# Patient Record
Sex: FEMALE | Race: WHITE | Employment: FULL TIME | URBAN - METROPOLITAN AREA
[De-identification: names, ages, dates, MRNs, and addresses within clinical notes are randomized per-mention and may not be internally consistent; named-entity substitution may affect disease eponyms.]

---

## 2017-01-23 DIAGNOSIS — H53.9 VISUAL DISTURBANCE: Primary | ICD-10-CM

## 2017-01-23 NOTE — PROGRESS NOTES
Patient's  related the patient is now having new neurologic symptoms. She is having visual obscuration without headache. This does not sound like common aura. Given the new symptoms she is having we will get an MRI scan of the brain and see her in the office once that has been completed.

## 2017-02-13 ENCOUNTER — HOSPITAL ENCOUNTER (OUTPATIENT)
Dept: MRI IMAGING | Age: 32
Discharge: HOME | End: 2017-02-13
Attending: PSYCHIATRY & NEUROLOGY
Payer: COMMERCIAL

## 2017-02-13 DIAGNOSIS — H53.9 VISUAL DISTURBANCE: ICD-10-CM

## 2017-02-13 PROCEDURE — 70553 MRI BRAIN STEM W/O & W/DYE: CPT

## 2017-02-13 PROCEDURE — A9585 GADOBUTROL INJECTION: HCPCS | Performed by: PSYCHIATRY & NEUROLOGY

## 2017-02-13 PROCEDURE — 74011250636 HC RX REV CODE- 250/636: Performed by: PSYCHIATRY & NEUROLOGY

## 2017-02-13 RX ADMIN — GADOBUTROL 6 ML: 604.72 INJECTION INTRAVENOUS at 12:38

## 2017-02-23 ENCOUNTER — OFFICE VISIT (OUTPATIENT)
Dept: INTERNAL MEDICINE CLINIC | Age: 32
End: 2017-02-23

## 2017-02-23 VITALS
HEIGHT: 65 IN | RESPIRATION RATE: 20 BRPM | TEMPERATURE: 98.6 F | SYSTOLIC BLOOD PRESSURE: 116 MMHG | DIASTOLIC BLOOD PRESSURE: 82 MMHG | WEIGHT: 144 LBS | BODY MASS INDEX: 23.99 KG/M2 | HEART RATE: 70 BPM | OXYGEN SATURATION: 97 %

## 2017-02-23 DIAGNOSIS — Z00.00 WELL ADULT EXAM: Primary | ICD-10-CM

## 2017-02-23 DIAGNOSIS — D50.8 IRON DEFICIENCY ANEMIA SECONDARY TO INADEQUATE DIETARY IRON INTAKE: ICD-10-CM

## 2017-02-23 DIAGNOSIS — R89.9 ABNORMAL LABORATORY TEST: ICD-10-CM

## 2017-02-23 DIAGNOSIS — Z00.00 WELL ADULT EXAM: ICD-10-CM

## 2017-02-23 NOTE — PROGRESS NOTES
Jaky Huntley is a 28 y.o. female and presents with [facility]  . Subjective:  Pt presents for NPV. She is a prior pt of Allan Hubbard MD   Her  is Ancelmo Duque from neurology upstairs with Dr. Nima Sandoval  Father is Mendy Bryant with [facility]    Daughter with allergy  Sleeping but cut out of diet  Breast feeding, supply good  Rectocele after first baby    constiopation  Cut corn  miralax      Migraines  Followed by Dr. Nima Sandoval  stable      Incidental finding of LDL cholesterol elevated  Can' t eat eggs      Review of Systems  Constitutional: negative for fevers, chills, anorexia and weight loss  Eyes:   negative for visual disturbance and irritation  ENT:   negative for tinnitus,sore throat,nasal congestion,ear pains. hoarseness  Respiratory:  negative for cough, hemoptysis, dyspnea,wheezing  CV:   negative for chest pain, palpitations, lower extremity edema  GI:   negative for nausea, vomiting, diarrhea, abdominal pain,melena  Endo:               negative for polyuria,polydipsia,polyphagia,heat intolerance  Genitourinary: negative for frequency, dysuria and hematuria  Integument:  negative for rash and pruritus  Hematologic:  negative for easy bruising and gum/nose bleeding  Musculoskel: negative for myalgias, arthralgias, back pain, muscle weakness, joint pain  Neurological:  negative for headaches, dizziness, vertigo, memory problems and gait   Behavl/Psych: negative for feelings of anxiety, depression, mood changes    Past Medical History:   Diagnosis Date    Headache     aura    Herpes simplex virus (HSV) infection     oral in genital region; no suppression due to scheduled c/s    Trauma     broken tail bone, VAGINAL delivery     Past Surgical History:   Procedure Laterality Date    HX GYN  10/2016         HX OTHER SURGICAL      wisdom teeth removed     Social History     Social History    Marital status:      Spouse name: N/A    Number of children: N/A    Years of education: N/A Social History Main Topics    Smoking status: Never Smoker    Smokeless tobacco: None    Alcohol use 0.6 - 1.2 oz/week     1 - 2 Cans of beer per week      Comment: occasional    Drug use: No    Sexual activity: Yes     Partners: Male     Birth control/ protection: None     Other Topics Concern    None     Social History Narrative    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        No Known Allergies    Objective:  Visit Vitals    /82 (BP 1 Location: Left arm, BP Patient Position: Sitting)    Pulse 70    Temp 98.6 °F (37 °C) (Oral)    Resp 20    Ht 5' 5\" (1.651 m)    Wt 144 lb (65.3 kg)    SpO2 97%    Breastfeeding Yes    BMI 23.96 kg/m2     Physical Exam:   General appearance - alert, well appearing, and in no distress  Mental status - alert, oriented to person, place, and time  EYE-REG, EOMI, corneas normal, no foreign bodies, visual acuity normal both eyes, no periorbital cellulitis  ENT-ENT exam normal, no neck nodes or sinus tenderness  Nose - normal and patent, no erythema, discharge or polyps  Mouth - mucous membranes moist, pharynx normal without lesions  Neck - supple, no significant adenopathy   Chest - clear to auscultation, no wheezes, rales or rhonchi, symmetric air entry   Heart - normal rate, regular rhythm, normal S1, S2, no murmurs, rubs, clicks or gallops   Abdomen - soft, nontender, nondistended, no masses or organomegaly  Lymph- no adenopathy palpable  Ext-peripheral pulses normal, no pedal edema, no clubbing or cyanosis  Skin-Warm and dry. no hyperpigmentation, vitiligo, or suspicious lesions  Neuro -alert, oriented, normal speech, no focal findings or movement disorder noted  Neck-normal C-spine, no tenderness, full ROM without pain      Results for orders placed or performed during the hospital encounter of 10/10/16   ANTIBODY SCREEN, EXTERNAL   Result Value Ref Range    Antibody screen, External negative    CHLAMYDIA DNA PROBE, EXTERNAL   Result Value Ref Range    Chlamydia, External negative    TYPE, ABO & RH, EXTERNAL   Result Value Ref Range    ABO,Rh O positive    HIV-1 AB, EXTERNAL   Result Value Ref Range    HIV, External non reactive    T PALLIDUM AB, EXTERNAL   Result Value Ref Range    T.  Pallidum Antibody, External negative    HEPATITIS B SURFACE AG, EXTERNAL   Result Value Ref Range    HBsAg, External negative    RUBELLA AB, IGG, EXTERNAL   Result Value Ref Range    Rubella, External non immune    N GONORRHOEAE, DNA PROBE, EXTERNAL   Result Value Ref Range    Gonorrhea, External negative    GYN RAPID GP B STREP, EXTERNAL   Result Value Ref Range    GrBStrep, External negative    CBC W/O DIFF   Result Value Ref Range    WBC 9.5 3.6 - 11.0 K/uL    RBC 3.66 (L) 3.80 - 5.20 M/uL    HGB 11.1 (L) 11.5 - 16.0 g/dL    HCT 33.4 (L) 35.0 - 47.0 %    MCV 91.3 80.0 - 99.0 FL    MCH 30.3 26.0 - 34.0 PG    MCHC 33.2 30.0 - 36.5 g/dL    RDW 15.6 (H) 11.5 - 14.5 %    PLATELET 949 (L) 478 - 400 K/uL   CBC WITH AUTOMATED DIFF   Result Value Ref Range    WBC 9.3 3.6 - 11.0 K/uL    RBC 3.02 (L) 3.80 - 5.20 M/uL    HGB 9.1 (L) 11.5 - 16.0 g/dL    HCT 27.4 (L) 35.0 - 47.0 %    MCV 90.7 80.0 - 99.0 FL    MCH 30.1 26.0 - 34.0 PG    MCHC 33.2 30.0 - 36.5 g/dL    RDW 15.7 (H) 11.5 - 14.5 %    PLATELET 755 (L) 335 - 400 K/uL    NEUTROPHILS 73 32 - 75 %    LYMPHOCYTES 18 12 - 49 %    MONOCYTES 8 5 - 13 %    EOSINOPHILS 1 0 - 7 %    BASOPHILS 0 0 - 1 %    ABS. NEUTROPHILS 6.8 1.8 - 8.0 K/UL    ABS. LYMPHOCYTES 1.7 0.8 - 3.5 K/UL    ABS. MONOCYTES 0.8 0.0 - 1.0 K/UL    ABS. EOSINOPHILS 0.1 0.0 - 0.4 K/UL    ABS. BASOPHILS 0.0 0.0 - 0.1 K/UL   TYPE & SCREEN   Result Value Ref Range    Crossmatch Expiration 10/13/2016     ABO/Rh(D) O POSITIVE     Antibody screen NEG      Prevention    Cardiovascular profile  Family hx  Exercising:  Elliptical   Blood pressure:  Health healthy diet:  Diabetes:  Cholesterol:  Renal function:      Cancer risk profile  Mammogram  Lung  Colonoscopy  Skin nonhealing in 2 weeks derm in past for acne rosacea  Gyn abnormal bleeding/discharge/abd pain/pressure, Tomeka Roney      Thyroid sx    Osteopenia prevention  Calcium 1000mg/day yes  Vitamin D 800iu/day yes    Mental health scale: 9/10  Depression  Anxiety  Sleep # of hours:  Energy Level:        Immunizations  TDAP 2016  Pneumonia vaccine  Flu vaccine  Shingles vaccine  HPV          Serge Stern was seen today for establish care. Diagnoses and all orders for this visit:    Well adult exam  Pt appears in good health. Her daugther has food allergies, she is breast feeding so on restricted diet with some fatigue  -     METABOLIC PANEL, COMPREHENSIVE; Future  -     LIPID PANEL; Future  -     TSH 3RD GENERATION; Future  -     CBC W/O DIFF; Future  -     FERRITIN; Future  -     IRON PROFILE; Future    Iron deficiency anemia secondary to inadequate dietary iron intake  Due to diet will check labs  -     CBC W/O DIFF; Future  -     FERRITIN; Future  -     IRON PROFILE; Future    Abnormal laboratory test  -     LIPID PANEL; Future    Bradycardia  Followed by cardiiology    Migraines   Followed by dr. Mandy Walters    This note will not be viewable in [address]

## 2017-02-23 NOTE — PATIENT INSTRUCTIONS

## 2017-02-23 NOTE — MR AVS SNAPSHOT
Visit Information Date & Time Provider Department Dept. Phone Encounter #  
 2/23/2017 10:15 AM Nicolasa Linder MD [facility] [address] [id number] Upcoming Health Maintenance Date Due  
 PAP AKA CERVICAL CYTOLOGY 6/20/2007 DTaP/Tdap/Td series (2 - Td) 7/1/2026 Allergies as of 2/23/2017  Review Complete On: 2/23/2017 By: Nicolasa Linder MD  
 No Known Allergies Current Immunizations  Reviewed on 2/23/2017 Name Date Influenza Vaccine 9/30/2016 MMR 10/12/2016 11:38 AM  
 Tdap 7/1/2016 Reviewed by Ángel Manley LPN on 0/37/6705 at 50:06 AM  
 Reviewed by Ángel Manley LPN on 6/65/6651 at 35:63 AM  
 Reviewed by Ángel Manley LPN on 5/06/8777 at 60:96 AM  
You Were Diagnosed With   
  
 Codes Comments Well adult exam    -  Primary ICD-10-CM: Z00.00 ICD-9-CM: V70.0 Iron deficiency anemia secondary to inadequate dietary iron intake     ICD-10-CM: D50.8 ICD-9-CM: 280. 1 Abnormal laboratory test     ICD-10-CM: R89.9 ICD-9-CM: 796.4 Vitals BP  
  
  
  
  
  
 116/82 (BP 1 Location: Left arm, BP Patient Position: Sitting) Vitals History BMI and BSA Data Body Mass Index Body Surface Area  
 23.96 kg/m 2 1.73 m 2 Preferred Pharmacy Pharmacy Name Phone [phone] [facility], [address] [phone] Your Updated Medication List  
  
   
This list is accurate as of: 2/23/17 11:30 AM.  Always use your most recent med list.  
  
  
  
  
 PRENATAL DHA+COMPLETE PRENATAL -300 mg-mcg-mg Cmpk Generic drug:  PNV no.24-iron-folic acid-dha Take  by mouth. To-Do List   
 02/23/2017 Lab:  CBC W/O DIFF   
  
 02/23/2017 Lab:  FERRITIN   
  
 02/23/2017 Lab:  IRON PROFILE   
  
 02/23/2017 Lab:  LIPID PANEL   
  
 02/23/2017 Lab:  METABOLIC PANEL, COMPREHENSIVE   
  
 02/23/2017   Lab:  TSH 3RD GENERATION   
  
  
 Patient Instructions Well Visit, Ages 25 to 48: Care Instructions Your Care Instructions Physical exams can help you stay healthy. Your doctor has checked your overall health and may have suggested ways to take good care of yourself. He or she also may have recommended tests. At home, you can help prevent illness with healthy eating, regular exercise, and other steps. Follow-up care is a key part of your treatment and safety. Be sure to make and go to all appointments, and call your doctor if you are having problems. It's also a good idea to know your test results and keep a list of the medicines you take. How can you care for yourself at home? · Reach and stay at a healthy weight. This will lower your risk for many problems, such as obesity, diabetes, heart disease, and high blood pressure. · Get at least 30 minutes of physical activity on most days of the week. Walking is a good choice. You also may want to do other activities, such as running, swimming, cycling, or playing tennis or team sports. Discuss any changes in your exercise program with your doctor. · Do not smoke or allow others to smoke around you. If you need help quitting, talk to your doctor about stop-smoking programs and medicines. These can increase your chances of quitting for good. · Talk to your doctor about whether you have any risk factors for sexually transmitted infections (STIs). Having one sex partner (who does not have STIs and does not have sex with anyone else) is a good way to avoid these infections. · Use birth control if you do not want to have children at this time. Talk with your doctor about the choices available and what might be best for you. · Protect your skin from too much sun. When you're outdoors from 10 a.m. to 4 p.m., stay in the shade or cover up with clothing and a hat with a wide brim. Wear sunglasses that block UV rays.  Even when it's cloudy, put broad-spectrum sunscreen (SPF 30 or higher) on any exposed skin. · See a dentist one or two times a year for checkups and to have your teeth cleaned. · Wear a seat belt in the car. · Drink alcohol in moderation, if at all. That means no more than 2 drinks a day for men and 1 drink a day for women. Follow your doctor's advice about when to have certain tests. These tests can spot problems early. For everyone · Cholesterol. Have the fat (cholesterol) in your blood tested after age 21. Your doctor will tell you how often to have this done based on your age, family history, or other things that can increase your risk for heart disease. · Blood pressure. Have your blood pressure checked during a routine doctor visit. Your doctor will tell you how often to check your blood pressure based on your age, your blood pressure results, and other factors. · Vision. Talk with your doctor about how often to have a glaucoma test. 
· Diabetes. Ask your doctor whether you should have tests for diabetes. · Colon cancer. Have a test for colon cancer at age 48. You may have one of several tests. If you are younger than 48, you may need a test earlier if you have any risk factors. Risk factors include whether you already had a precancerous polyp removed from your colon or whether your parent, brother, sister, or child has had colon cancer. For women · Breast exam and mammogram. Talk to your doctor about when you should have a clinical breast exam and a mammogram. Medical experts differ on whether and how often women under 50 should have these tests. Your doctor can help you decide what is right for you. · Pap test and pelvic exam. Begin Pap tests at age 24. A Pap test is the best way to find cervical cancer. The test often is part of a pelvic exam. Ask how often to have this test. 
· Tests for sexually transmitted infections (STIs).  Ask whether you should have tests for STIs. You may be at risk if you have sex with more than one person, especially if your partners do not wear condoms. For men · Tests for sexually transmitted infections (STIs). Ask whether you should have tests for STIs. You may be at risk if you have sex with more than one person, especially if you do not wear a condom. · Testicular cancer exam. Ask your doctor whether you should check your testicles regularly. · Prostate exam. Talk to your doctor about whether you should have a blood test (called a PSA test) for prostate cancer. Experts differ on whether and when men should have this test. Some experts suggest it if you are older than 39 and are -American or have a father or brother who got prostate cancer when he was younger than 72. When should you call for help? Watch closely for changes in your health, and be sure to contact your doctor if you have any problems or symptoms that concern you. Where can you learn more? Go to http://braydon-dorian.info/. Enter P072 in the search box to learn more about \"Well Visit, Ages 25 to 48: Care Instructions. \" Current as of: July 19, 2016 Content Version: 11.1 © 6913-3067 [employer]. Care instructions adapted under license by [employer] (which disclaims liability or warranty for this information). If you have questions about a medical condition or this instruction, always ask your healthcare professional. [address] any warranty or liability for your use of this information. Introducing [facility] [place] [facility]! Dear Lizzie Cross: 
Thank you for requesting a [employer] account. Our records indicate that you already have an active [employer] account. You can access your account anytime at [email or web address] Did you know that you can access your hospital and ER discharge instructions at any time in [employer]?   You can also review all of your test results from your hospital stay or ER visit. Additional Information If you have questions, please visit the Frequently Asked Questions section of the [employer] website at [email or web address] Remember, [employer] is NOT to be used for urgent needs. For medical emergencies, dial 911. Now available from your iPhone and Android! Please provide this summary of care documentation to your next provider. Your primary care clinician is listed as [facility]. If you have any questions after today's visit, please call [phone].

## 2017-02-25 LAB
ALBUMIN SERPL-MCNC: 4.7 G/DL (ref 3.5–5.5)
ALBUMIN/GLOB SERPL: 1.6 {RATIO} (ref 1.1–2.5)
ALP SERPL-CCNC: 121 IU/L (ref 39–117)
ALT SERPL-CCNC: 18 IU/L (ref 0–32)
AST SERPL-CCNC: 19 IU/L (ref 0–40)
BILIRUB SERPL-MCNC: 0.3 MG/DL (ref 0–1.2)
BUN SERPL-MCNC: 11 MG/DL (ref 6–20)
BUN/CREAT SERPL: 15 (ref 8–20)
CALCIUM SERPL-MCNC: 9.8 MG/DL (ref 8.7–10.2)
CHLORIDE SERPL-SCNC: 105 MMOL/L (ref 96–106)
CHOLEST SERPL-MCNC: 191 MG/DL (ref 100–199)
CO2 SERPL-SCNC: 23 MMOL/L (ref 18–29)
CREAT SERPL-MCNC: 0.72 MG/DL (ref 0.57–1)
ERYTHROCYTE [DISTWIDTH] IN BLOOD BY AUTOMATED COUNT: 14.2 % (ref 12.3–15.4)
FERRITIN SERPL-MCNC: 120 NG/ML (ref 15–150)
GLOBULIN SER CALC-MCNC: 3 G/DL (ref 1.5–4.5)
GLUCOSE SERPL-MCNC: 82 MG/DL (ref 65–99)
HCT VFR BLD AUTO: 40.6 % (ref 34–46.6)
HDLC SERPL-MCNC: 56 MG/DL
HGB BLD-MCNC: 13.3 G/DL (ref 11.1–15.9)
INTERPRETATION, 910389: NORMAL
IRON SATN MFR SERPL: 25 % (ref 15–55)
IRON SERPL-MCNC: 88 UG/DL (ref 27–159)
LDLC SERPL CALC-MCNC: 116 MG/DL (ref 0–99)
MCH RBC QN AUTO: 30 PG (ref 26.6–33)
MCHC RBC AUTO-ENTMCNC: 32.8 G/DL (ref 31.5–35.7)
MCV RBC AUTO: 91 FL (ref 79–97)
PLATELET # BLD AUTO: 173 X10E3/UL (ref 150–379)
POTASSIUM SERPL-SCNC: 4.4 MMOL/L (ref 3.5–5.2)
PROT SERPL-MCNC: 7.7 G/DL (ref 6–8.5)
RBC # BLD AUTO: 4.44 X10E6/UL (ref 3.77–5.28)
SODIUM SERPL-SCNC: 145 MMOL/L (ref 134–144)
TIBC SERPL-MCNC: 351 UG/DL (ref 250–450)
TRIGL SERPL-MCNC: 94 MG/DL (ref 0–149)
TSH SERPL DL<=0.005 MIU/L-ACNC: 2.55 UIU/ML (ref 0.45–4.5)
UIBC SERPL-MCNC: 263 UG/DL (ref 131–425)
VLDLC SERPL CALC-MCNC: 19 MG/DL (ref 5–40)
WBC # BLD AUTO: 5.5 X10E3/UL (ref 3.4–10.8)

## 2017-03-31 DIAGNOSIS — R53.83 FATIGUE, UNSPECIFIED TYPE: Primary | ICD-10-CM

## 2017-04-07 RX ORDER — ZOLMITRIPTAN 5 MG/1
SPRAY NASAL
Qty: 9 CONTAINER | Refills: 3 | Status: SHIPPED | OUTPATIENT
Start: 2017-04-07 | End: 2018-04-05 | Stop reason: ALTCHOICE

## 2017-04-07 NOTE — PROGRESS NOTES
I spoke with the patient's  today and Zomig nasal is working well for her migraines in terms of abortive therapy. I have given him 2 samples as well as a co-pay card and sent a prescription to the [facility].

## 2017-05-15 ENCOUNTER — DOCUMENTATION ONLY (OUTPATIENT)
Dept: SURGERY | Age: 32
End: 2017-05-15

## 2017-05-16 ENCOUNTER — TELEPHONE (OUTPATIENT)
Dept: SURGERY | Age: 32
End: 2017-05-16

## 2017-05-16 ENCOUNTER — HOSPITAL ENCOUNTER (OUTPATIENT)
Dept: LAB | Age: 32
Discharge: HOME | End: 2017-05-16

## 2017-05-16 ENCOUNTER — DOCUMENTATION ONLY (OUTPATIENT)
Dept: SURGERY | Age: 32
End: 2017-05-16

## 2017-05-16 ENCOUNTER — OFFICE VISIT (OUTPATIENT)
Dept: SURGERY | Age: 32
End: 2017-05-16

## 2017-05-16 VITALS
SYSTOLIC BLOOD PRESSURE: 135 MMHG | HEART RATE: 69 BPM | WEIGHT: 139 LBS | BODY MASS INDEX: 23.16 KG/M2 | DIASTOLIC BLOOD PRESSURE: 93 MMHG | HEIGHT: 65 IN

## 2017-05-16 DIAGNOSIS — N63.20 LEFT BREAST LUMP: Primary | ICD-10-CM

## 2017-05-16 NOTE — PROGRESS NOTES
[facility]  OFFICE PROCEDURE PROGRESS NOTE        Chart reviewed for the following:   I, Dr. Saba Gómez have reviewed the History, Physical and updated the Allergic reactions for [facility]     TIME OUT performed immediately prior to start of procedure:   I, Dr. Saba Gómez, have performed the following reviews on Leta Bailon prior to the start of the procedure:            * Patient was identified by name and date of birth   * Agreement on procedure being performed was verified  * Risks and Benefits explained to the patient  * Procedure site verified and marked as necessary  * Patient was positioned for comfort  * Consent was signed and verified     Time: 1015am      Date of procedure: 5/16/2017    Procedure performed by:  Dr. Saba Gómez    Provider assisted by: Ree Paul RN    Patient assisted by:Yanna Staley RN    How tolerated by patient: Patient tolerated the procedure well without complications. Denies pain post biopsy. Post Procedural Pain Scale: 0    Comments:Written and verbal post biopsy instructions reviewed with and given to patient with her understanding.

## 2017-05-16 NOTE — TELEPHONE ENCOUNTER
Patient had a breast biopsy this morning at [facility] by Dr. Coni Uriostegui. L/M this afternoon that she was still bleeding. I tried to call the patient back, but was unable to reach her, so I L/M for her to call us back on the nurse line since it is almost after hours. I informed her nurse, Francheska Anderson, that the patient had called.

## 2017-05-16 NOTE — PROGRESS NOTES
HISTORY OF PRESENT ILLNESS  Mt Sanchez is a 28 y.o. female. HPI NEW patient referral for consultation by Dr. Paul Cannon for a LEFT palpable breast mass for one a month. The breast lump is not painful to palpation. The patient is still nursing her 11 month old daughter. She had mastitis with her first baby. The patient denies any nipple inversion or skin changes. Obstetric History       T1      TAB0   SAB0   E0   M0   L0    Obstetric Comments   Menarche:  Age 16. SPC:8877. # of Children: 2  Age at Delivery of First Child: 33.   Hysterectomy/oophorectomy no/no. Breast Bx: no.  Hx of Breast Feeding: yes. BCP: 3304-7366 currently using IUD. Hormone therapy: none. Family history-  Maternal grandmother had breast cancer age 64 and age 79 and is . Maternal great grandmother had post menopausal breast cancer. Mammogram and ultrasound -     Review of Systems   Constitutional: Negative. HENT: Negative. Eyes: Negative. Respiratory: Negative. Cardiovascular: Negative. Gastrointestinal: Negative. Genitourinary: Negative. Musculoskeletal: Negative. Skin: Negative. Neurological: Negative. Endo/Heme/Allergies: Negative. Psychiatric/Behavioral: Negative. Physical Exam   Pulmonary/Chest: Right breast exhibits no inverted nipple, no mass, no nipple discharge, no skin change and no tenderness. Left breast exhibits mass (8 mm oval mass with dropout 12:00 1/3). Left breast exhibits no inverted nipple, no nipple discharge, no skin change and no tenderness. Breasts are symmetrical.       Lymphadenopathy:     She has no cervical adenopathy. She has no axillary adenopathy. Right: No supraclavicular adenopathy present. Left: No supraclavicular adenopathy present. US - Guided Core Biopsy  Indication : Left Breast mass 12:00 1/3 .   palpable. Ultrasound Findings: 8 mm oval mass with dropout  Prep : alcohol.    Anesthesia : 1% lidocaine with epinephrine, 8 cc. Device : The hand-held 10 gauge BARD needle was inserted through the lesion and captured tissue with real-time Ultrasound Confirmation. .   Core Sampling :  3 cores were obtained. Marker: clip placed   Dressing : Steristrips, gauze and tape. Instructions : Remove gauze this evening. Remove steristrips in one week. 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 Concordance: yes  ASSESSMENT and PLAN    ICD-10-CM ICD-9-CM    1. Left breast lump N63 611.72 SURGICAL PATHOLOGY     LEFT breast mass biopsied    Called patient with benign pathology report. She had bleeding from the biopsy site all day after the biopsy. That has stopped. Milk is blood tinged but getting lighter and should resolve completely in a day or two.

## 2017-05-16 NOTE — TELEPHONE ENCOUNTER
Called patient back after speaking with Dr. Jimenez Alston. Advised pressure dressing, ice and rest if possible. Recommended trying to pump with dressing on if possible and hold pumping for as long as possible. The patient will do all of the above. She says that she may try to nurse from the side that was biopsied to see if this works better. Advised her to call tomorrow if she has any further questions/problems. She was very appreciative of the phone call.

## 2017-05-16 NOTE — PATIENT INSTRUCTIONS
Breast Lumps: Care Instructions  Your Care Instructions  Breast lumps are common, especially in women between ages 27 and 48. Many womens breasts feel lumpy and tender before their menstrual period. Women also may have lumps when they are breastfeeding. Breast lumps may go away after menopause. All new breast lumps in women after menopause should be checked by a doctor. Although lumps may be normal for you, it is important to have your doctor check any lump or thickness that is not like the rest of your breast to make sure it is not cancer. A lump may be larger, harder, or different from the rest of your breast tissue. Follow-up care is a key part of your treatment and safety. Be sure to make and go to all appointments, and call your doctor if you are having problems. Its also a good idea to know your test results and keep a list of the medicines you take. How can you care for yourself at home? · Make an appointment to have a mammogram and other follow-up visits as recommended by your doctor. When should you call for help? Call your doctor now or seek immediate medical care if:  · You have new changes in a breast, such as:  ¨ A lump or thickening in your breast or armpit. ¨ A change in the breast's size or shape. ¨ Skin changes, such as dimples or puckers. ¨ Nipple discharge. ¨ A change in the color or feel of the skin of your breast or the darker area around the nipple (areola). ¨ A change in the shape of the nipple (it may look like it's being pulled into the breast). · You have symptoms of a breast infection, such as:  ¨ Increased pain, swelling, redness, or warmth around a breast.  ¨ Red streaks extending from the breast.  ¨ Pus draining from a breast.  ¨ A fever. Watch closely for changes in your health, and be sure to contact your doctor if:  · You do not get better as expected. Where can you learn more? Go to http://braydon-dorian.info/.   Enter B023 in the search box to learn more about \"Breast Lumps: Care Instructions. \"  Current as of: October 13, 2016  Content Version: 11.2  © 3819-4447 [employer], [employer]. Care instructions adapted under license by [employer] (which disclaims liability or warranty for this information). If you have questions about a medical condition or this instruction, always ask your healthcare professional. [address] any warranty or liability for your use of this information.

## 2017-05-18 NOTE — COMMUNICATION BODY
HISTORY OF PRESENT ILLNESS  Megha Cruz is a 28 y.o. female. HPI NEW patient referral for consultation by Dr. Donnette Kehr for a LEFT palpable breast mass for one a month. The breast lump is not painful to palpation. The patient is still nursing her 11 month old daughter. She had mastitis with her first baby. The patient denies any nipple inversion or skin changes. Obstetric History       T1      TAB0   SAB0   E0   M0   L0    Obstetric Comments   Menarche:  Age 16. [id number]. # of Children: 2  Age at Delivery of First Child: 33.   Hysterectomy/oophorectomy no/no. Breast Bx: no.  Hx of Breast Feeding: yes. BCP: 7419-4995 currently using IUD. Hormone therapy: none. Family history-  Maternal grandmother had breast cancer age 64 and age 79 and is . Maternal great grandmother had post menopausal breast cancer. Mammogram and ultrasound -     Review of Systems   Constitutional: Negative. HENT: Negative. Eyes: Negative. Respiratory: Negative. Cardiovascular: Negative. Gastrointestinal: Negative. Genitourinary: Negative. Musculoskeletal: Negative. Skin: Negative. Neurological: Negative. Endo/Heme/Allergies: Negative. Psychiatric/Behavioral: Negative. Physical Exam   Pulmonary/Chest: Right breast exhibits no inverted nipple, no mass, no nipple discharge, no skin change and no tenderness. Left breast exhibits mass (8 mm oval mass with dropout 12:00 1/3). Left breast exhibits no inverted nipple, no nipple discharge, no skin change and no tenderness. Breasts are symmetrical.       Lymphadenopathy:     She has no cervical adenopathy. She has no axillary adenopathy. Right: No supraclavicular adenopathy present. Left: No supraclavicular adenopathy present. US - Guided Core Biopsy  Indication : Left Breast mass 12:00 1/3 .   palpable. Ultrasound Findings: 8 mm oval mass with dropout  Prep : alcohol.    Anesthesia : 1% lidocaine with epinephrine, 8 cc. Device : The hand-held 10 gauge BARD needle was inserted through the lesion and captured tissue with real-time Ultrasound Confirmation. .   Core Sampling :  3 cores were obtained. Marker: clip placed   Dressing : Steristrips, gauze and tape. Instructions : Remove gauze this evening. Remove steristrips in one week. 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 Concordance: yes  ASSESSMENT and PLAN    ICD-10-CM ICD-9-CM    1. Left breast lump N63 611.72 SURGICAL PATHOLOGY     LEFT breast mass biopsied    Called patient with benign pathology report. She had bleeding from the biopsy site all day after the biopsy. That has stopped. Milk is blood tinged but getting lighter and should resolve completely in a day or two.

## 2017-05-30 RX ORDER — ZOLMITRIPTAN 5 MG/1
TABLET, ORALLY DISINTEGRATING ORAL
Qty: 9 TAB | Refills: 5 | Status: SHIPPED | OUTPATIENT
Start: 2017-05-30 | End: 2018-04-05 | Stop reason: ALTCHOICE

## 2017-06-02 ENCOUNTER — TELEPHONE (OUTPATIENT)
Dept: NEUROLOGY | Age: 32
End: 2017-06-02

## 2017-08-30 NOTE — TELEPHONE ENCOUNTER
Per patient, increase in frequency of headaches and side effects from zomig. Verbal order received from provider and called to pharmacy listed.

## 2017-11-03 NOTE — PROGRESS NOTES
I received a call from the patient's  today. Patient has been prescribed Midrin but the pharmacy they were using wanted to charge an exorbitant amount of money.   I reprinted a prescription today because the  found a pharmacy that would provide the medication at a reasonable cost

## 2017-12-07 ENCOUNTER — OFFICE VISIT (OUTPATIENT)
Dept: INTERNAL MEDICINE CLINIC | Age: 32
End: 2017-12-07

## 2017-12-07 VITALS
RESPIRATION RATE: 18 BRPM | HEART RATE: 86 BPM | WEIGHT: 151 LBS | OXYGEN SATURATION: 97 % | BODY MASS INDEX: 25.16 KG/M2 | SYSTOLIC BLOOD PRESSURE: 113 MMHG | HEIGHT: 65 IN | TEMPERATURE: 98.3 F | DIASTOLIC BLOOD PRESSURE: 79 MMHG

## 2017-12-07 DIAGNOSIS — R53.83 FATIGUE, UNSPECIFIED TYPE: ICD-10-CM

## 2017-12-07 DIAGNOSIS — E55.9 VITAMIN D DEFICIENCY: ICD-10-CM

## 2017-12-07 DIAGNOSIS — K92.1 BLOOD IN STOOL: ICD-10-CM

## 2017-12-07 DIAGNOSIS — Z00.00 WELL ADULT EXAM: Primary | ICD-10-CM

## 2017-12-07 NOTE — PATIENT INSTRUCTIONS

## 2017-12-07 NOTE — PROGRESS NOTES
Jennie Chang is a 33 y.o. female and presents with Complete Physical  .    Subjective:      Fatigue  Energy level 7/10 in the am, 5/10 at noon, 5-7 then back to 5/10 dinner,   Baseline 7-8/10  Sleeps 8-10 hours/sleep  Occasional snoring  No constitutinal sx or joint pain but baseline hip issues  July went to see Bipin Barnes biopsy      Cardiology  Bradycardia   MV regurge other   She does not wear a fit bit    constiopation  Cut corn  miralax prn      Migraines  Followed by Dr. Juancarlos Blank  stable  Has not been on amitriptyline      Review of Systems  Constitutional: negative for fevers, chills, anorexia and weight loss  Eyes:   negative for visual disturbance and irritation  ENT:   negative for tinnitus,sore throat,nasal congestion,ear pains. hoarseness  Respiratory:  negative for cough, hemoptysis, dyspnea,wheezing  CV:   negative for chest pain, palpitations, lower extremity edema  GI:   negative for nausea, vomiting, diarrhea, abdominal pain,melena  Endo:               negative for polyuria,polydipsia,polyphagia,heat intolerance  Genitourinary: negative for frequency, dysuria and hematuria  Integument:  negative for rash and pruritus  Hematologic:  negative for easy bruising and gum/nose bleeding  Musculoskel: negative for myalgias, arthralgias, back pain, muscle weakness, joint pain  Neurological:  negative for headaches, dizziness, vertigo, memory problems and gait   Behavl/Psych: negative for feelings of anxiety, depression, mood changes    Past Medical History:   Diagnosis Date    Headache      migraines aura    Herpes simplex virus (HSV) infection     oral in genital region; no suppression due to scheduled c/s    Trauma     broken tail bone, VAGINAL delivery     Past Surgical History:   Procedure Laterality Date    HX GYN  10/2016         HX OTHER SURGICAL      wisdom teeth removed     Social History     Social History    Marital status:      Spouse name: N/A    Number of children: N/A    Years of education: N/A     Social History Main Topics    Smoking status: Never Smoker    Smokeless tobacco: None    Alcohol use 0.6 - 1.2 oz/week     1 - 2 Cans of beer per week      Comment: occasional    Drug use: No    Sexual activity: Yes     Partners: Male     Birth control/ protection: None     Other Topics Concern    None     Social History Narrative    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    Did [facility] health            2 kids    3.6 yo and 4.5 months         Family History   Problem Relation Age of Onset    Headache Mother      htn    Hypertension Mother     Headache Father      heart block    Heart Disease Father     Cancer Maternal Grandmother      breast    Heart Disease Maternal Grandfather     Heart Disease Paternal Grandmother     Heart Disease Paternal Grandfather     Cancer Other      breast     Current Outpatient Prescriptions   Medication Sig Dispense Refill    hkkmohp-gdzwrftcb-uaaysvcwgkzi (MIDRIN) -325 mg capsule Take 2 tabs at headache onset and repeat every hour x 5 doses. Max 5 tabs in 24 hours. 30 Cap 3    ZOLMitriptan (ZOMIG ZMT) 5 mg disintegrating tablet Take 1 tab at headache onset and may repeat in 2 hours x 1 dose. Max 2 tabs in 24 hours.  9 Tab 5    ZOLMitriptan (ZOMIG) 5 mg nasal solution 1 spray at HA onset and repeat in 2 hours x 1 prn 9 Container 3     No Known Allergies    Objective:  Visit Vitals    /79 (BP 1 Location: Left arm, BP Patient Position: Sitting)    Pulse 86    Temp 98.3 °F (36.8 °C) (Oral)    Resp 18    Ht 5' 5\" (1.651 m)    Wt 151 lb (68.5 kg)    LMP  (LMP Unknown)    SpO2 97%    Breastfeeding No    BMI 25.13 kg/m2     Physical Exam:   General appearance - alert, well appearing, and in no distress  Mental status - alert, oriented to person, place, and time  EYE-REG, EOMI, corneas normal, no foreign bodies, visual acuity normal both eyes, no periorbital cellulitis  ENT-ENT exam normal, no neck nodes or sinus tenderness  Nose - normal and patent, no erythema, discharge or polyps  Mouth - mucous membranes moist, pharynx normal without lesions  Neck - supple, no significant adenopathy   Chest - clear to auscultation, no wheezes, rales or rhonchi, symmetric air entry   Heart - normal rate, regular rhythm, normal S1, S2, no murmurs, rubs, clicks or gallops   Abdomen - soft, nontender, nondistended, no masses or organomegaly  Lymph- no adenopathy palpable  Ext-peripheral pulses normal, no pedal edema, no clubbing or cyanosis  Skin-Warm and dry. no hyperpigmentation, vitiligo, or suspicious lesions  Neuro -alert, oriented, normal speech, no focal findings or movement disorder noted  Neck-normal C-spine, no tenderness, full ROM without pain      Results for orders placed or performed in visit on 26/21/88   METABOLIC PANEL, COMPREHENSIVE   Result Value Ref Range    Glucose 82 65 - 99 mg/dL    BUN 11 6 - 20 mg/dL    Creatinine 0.72 0.57 - 1.00 mg/dL    GFR est non- >59 mL/min/1.73    GFR est  >59 mL/min/1.73    BUN/Creatinine ratio 15 8 - 20    Sodium 145 (H) 134 - 144 mmol/L    Potassium 4.4 3.5 - 5.2 mmol/L    Chloride 105 96 - 106 mmol/L    CO2 23 18 - 29 mmol/L    Calcium 9.8 8.7 - 10.2 mg/dL    Protein, total 7.7 6.0 - 8.5 g/dL    Albumin 4.7 3.5 - 5.5 g/dL    GLOBULIN, TOTAL 3.0 1.5 - 4.5 g/dL    A-G Ratio 1.6 1.1 - 2.5    Bilirubin, total 0.3 0.0 - 1.2 mg/dL    Alk.  phosphatase 121 (H) 39 - 117 IU/L    AST (SGOT) 19 0 - 40 IU/L    ALT (SGPT) 18 0 - 32 IU/L   LIPID PANEL   Result Value Ref Range    Cholesterol, total 191 100 - 199 mg/dL    Triglyceride 94 0 - 149 mg/dL    HDL Cholesterol 56 >39 mg/dL    VLDL, calculated 19 5 - 40 mg/dL    LDL, calculated 116 (H) 0 - 99 mg/dL   TSH 3RD GENERATION   Result Value Ref Range    TSH 2.550 0.450 - 4.500 uIU/mL   CBC W/O DIFF   Result Value Ref Range    WBC 5.5 3.4 - 10.8 x10E3/uL    RBC 4.44 3.77 - 5.28 x10E6/uL    HGB 13.3 11.1 - 15.9 g/dL    HCT 40.6 34.0 - 46.6 %    MCV 91 79 - 97 fL    MCH 30.0 26.6 - 33.0 pg    MCHC 32.8 31.5 - 35.7 g/dL    RDW 14.2 12.3 - 15.4 %    PLATELET 502 524 - 569 x10E3/uL   FERRITIN   Result Value Ref Range    Ferritin 120 15 - 150 ng/mL   IRON PROFILE   Result Value Ref Range    TIBC 351 250 - 450 ug/dL    UIBC 263 131 - 425 ug/dL    Iron 88 27 - 159 ug/dL    Iron % saturation 25 15 - 55 %   CVD REPORT   Result Value Ref Range    INTERPRETATION Note      Prevention    Cardiovascular profile  Family hx  Exercising:  Elliptical   Blood pressure:  Health healthy diet:  Diabetes:  Cholesterol:  Renal function:      Cancer risk profile  Mamm[facility]  Lung exercise intolerance  Colonoscopy chronic constipation painful docusate  Skin nonhealing in 2 weeks derm in past for acne rosacea  Gyn abnormal bleeding/discharge/abd pain/pressure, Jillian Holiday      Thyroid sx    Osteopenia prevention  Calcium 1000mg/day yes  Vitamin D 800iu/day yes    Mental health scale: 9/10  Depression  Anxiety  Sleep # of hours:  Energy Level:        Immunizations  TDAP 2016  Pneumonia vaccine  Flu vaccine  Shingles vaccine  HPV          Diagnoses and all orders for this visit:    1. Well adult exam  Pt appears in overall good health. She does have some fatigue  -     CBC W/O DIFF  -     METABOLIC PANEL, COMPREHENSIVE  -     LIPID PANEL  -     SED RATE (ESR)    2. Fatigue, unspecified type  Notes she does worry a lot, worrier  Will check labs  May benefit from amitripyline for migraines and maybe for worry- benefit rem possibly  contsipation may be contributing  -     CBC W/O DIFF  -     SED RATE (ESR)  -     MARK W/REFLEX  -     REFERRAL TO GASTROENTEROLOGY    3. Blood in stool  Will check fit  I think constipation is contributing to [address]  Will take docusate  -     OCCULT BLOOD, IMMUNOASSAY (FIT);  Future  -     REFERRAL TO PHYSICAL THERAPY  -     REFERRAL TO GASTROENTEROLOGY    4. Vitamin D deficiency  -     VITAMIN D, 25 HYDROXY      Follow up in 2-3 months    This note will not be viewable in MyChart.

## 2017-12-07 NOTE — MR AVS SNAPSHOT
Visit Information Date & Time Provider Department Dept. Phone Encounter #  
 12/7/2017  9:00 AM Jessica Vuong MD [facility] [address] [id number] Upcoming Health Maintenance Date Due  
 PAP AKA CERVICAL CYTOLOGY 6/20/2007 DTaP/Tdap/Td series (2 - Td) 7/1/2026 Allergies as of 12/7/2017  Review Complete On: 12/7/2017 By: Jessica Vuong MD  
 No Known Allergies Current Immunizations  Reviewed on 12/7/2017 Name Date Influenza Vaccine 10/30/2017, 9/30/2016 MMR 10/12/2016 11:38 AM  
 Tdap 7/1/2016 Reviewed by Dinah Guzman LPN on 02/7/1384 at  9:07 AM  
You Were Diagnosed With   
  
 Codes Comments Well adult exam    -  Primary ICD-10-CM: Z00.00 ICD-9-CM: V70.0 Fatigue, unspecified type     ICD-10-CM: R53.83 ICD-9-CM: 780.79 Blood in stool     ICD-10-CM: K92.1 ICD-9-CM: 578.1 Vitamin D deficiency     ICD-10-CM: E55.9 ICD-9-CM: 268.9 Vitals BP Pulse Temp Resp Height(growth percentile) Weight(growth percentile) 113/79 (BP 1 Location: Left arm, BP Patient Position: Sitting) 86 98.3 °F (36.8 °C) (Oral) 18 5' 5\" (1.651 m) 151 lb (68.5 kg) LMP SpO2 Breastfeeding? BMI OB Status Smoking Status (LMP Unknown) 97% No 25.13 kg/m2 IUD Never Smoker Vitals History BMI and BSA Data Body Mass Index Body Surface Area  
 25.13 kg/m 2 1.77 m 2 Preferred Pharmacy Pharmacy Name Phone [facility] - [address] [phone] Your Updated Medication List  
  
   
This list is accurate as of: 12/7/17 10:13 AM.  Always use your most recent med list.  
  
  
  
  
 avzabvz-gqvljvriv-khjulzsakfdr -325 mg capsule Commonly known as:  Preethi Cronin Take 2 tabs at headache onset and repeat every hour x 5 doses. Max 5 tabs in 24 hours. * ZOLMitriptan 5 mg nasal solution Commonly known as:  ZOMIG  
 1 spray at HA onset and repeat in 2 hours x 1 prn * ZOLMitriptan 5 mg disintegrating tablet Commonly known as:  Rayleen Meter Take 1 tab at headache onset and may repeat in 2 hours x 1 dose. Max 2 tabs in 24 hours. * Notice: This list has 2 medication(s) that are the same as other medications prescribed for you. Read the directions carefully, and ask your doctor or other care provider to review them with you. We Performed the Following MARK W/REFLEX [45346 CPT(R)] CBC W/O DIFF [02803 CPT(R)] LIPID PANEL [33083 CPT(R)] METABOLIC PANEL, COMPREHENSIVE [53515 CPT(R)] REFERRAL TO GASTROENTEROLOGY [ANO62 Custom] REFERRAL TO PHYSICAL THERAPY [SFW26 Custom] Comments:  
 Please refer for pelvic pt constipation and rectocele SED RATE (ESR) [id number] CPT(R)] VITAMIN D, 25 HYDROXY [id number] CPT(R)] To-Do List   
 01/06/2018 Lab:  OCCULT BLOOD, IMMUNOASSAY (FIT) Referral Information Referral ID Referred By Referred To  
  
 [id number] [address] [place], [place] [address]Balbir [address] [place] [address] [address] Phone: [phone] Fax: [phone] Visits Status Start Date End Date 1 New Request 12/7/17 12/7/18 If your referral has a status of pending review or denied, additional information will be sent to support the outcome of this decision. Referral ID Referred By Referred To  
 [id number] [address] [place], [place] [address]Oswald MD  
   [address] [facility] [address] Phone: [phone] Fax: [phone] Visits Status Start Date End Date 1 New Request 12/7/17 12/7/18 If your referral has a status of pending review or denied, additional information will be sent to support the outcome of this decision. Patient Instructions Well Visit, Ages 25 to 48: Care Instructions Your Care Instructions Physical exams can help you stay healthy. Your doctor has checked your overall health and may have suggested ways to take good care of yourself. He or she also may have recommended tests. At home, you can help prevent illness with healthy eating, regular exercise, and other steps. Follow-up care is a key part of your treatment and safety. Be sure to make and go to all appointments, and call your doctor if you are having problems. It's also a good idea to know your test results and keep a list of the medicines you take. How can you care for yourself at home? · Reach and stay at a healthy weight. This will lower your risk for many problems, such as obesity, diabetes, heart disease, and high blood pressure. · Get at least 30 minutes of physical activity on most days of the week. Walking is a good choice. You also may want to do other activities, such as running, swimming, cycling, or playing tennis or team sports. Discuss any changes in your exercise program with your doctor. · Do not smoke or allow others to smoke around you. If you need help quitting, talk to your doctor about stop-smoking programs and medicines. These can increase your chances of quitting for good. · Talk to your doctor about whether you have any risk factors for sexually transmitted infections (STIs). Having one sex partner (who does not have STIs and does not have sex with anyone else) is a good way to avoid these infections. · Use birth control if you do not want to have children at this time. Talk with your doctor about the choices available and what might be best for you. · Protect your skin from too much sun. When you're outdoors from 10 a.m. to 4 p.m., stay in the shade or cover up with clothing and a hat with a wide brim. Wear sunglasses that block UV rays. Even when it's cloudy, put broad-spectrum sunscreen (SPF 30 or higher) on any exposed skin. · See a dentist one or two times a year for checkups and to have your teeth cleaned. · Wear a seat belt in the car. · Drink alcohol in moderation, if at all. That means no more than 2 drinks a day for men and 1 drink a day for women. Follow your doctor's advice about when to have certain tests. These tests can spot problems early. For everyone · Cholesterol. Have the fat (cholesterol) in your blood tested after age 21. Your doctor will tell you how often to have this done based on your age, family history, or other things that can increase your risk for heart disease. · Blood pressure. Have your blood pressure checked during a routine doctor visit. Your doctor will tell you how often to check your blood pressure based on your age, your blood pressure results, and other factors. · Vision. Talk with your doctor about how often to have a glaucoma test. 
· Diabetes. Ask your doctor whether you should have tests for diabetes. · Colon cancer. Have a test for colon cancer at age 48. You may have one of several tests. If you are younger than 48, you may need a test earlier if you have any risk factors. Risk factors include whether you already had a precancerous polyp removed from your colon or whether your parent, brother, sister, or child has had colon cancer. For women · Breast exam and mammogram. Talk to your doctor about when you should have a clinical breast exam and a mammogram. Medical experts differ on whether and how often women under 50 should have these tests. Your doctor can help you decide what is right for you. · Pap test and pelvic exam. Begin Pap tests at age 24. A Pap test is the best way to find cervical cancer. The test often is part of a pelvic exam. Ask how often to have this test. 
· Tests for sexually transmitted infections (STIs). Ask whether you should have tests for STIs. You may be at risk if you have sex with more than one person, especially if your partners do not wear condoms. For men · Tests for sexually transmitted infections (STIs).  Ask whether you should have tests for STIs. You may be at risk if you have sex with more than one person, especially if you do not wear a condom. · Testicular cancer exam. Ask your doctor whether you should check your testicles regularly. · Prostate exam. Talk to your doctor about whether you should have a blood test (called a PSA test) for prostate cancer. Experts differ on whether and when men should have this test. Some experts suggest it if you are older than 39 and are -American or have a father or brother who got prostate cancer when he was younger than 72. When should you call for help? Watch closely for changes in your health, and be sure to contact your doctor if you have any problems or symptoms that concern you. Where can you learn more? Go to http://braydon-dorian.info/. Enter P072 in the search box to learn more about \"Well Visit, Ages 25 to 48: Care Instructions. \" Current as of: May 12, 2017 Content Version: 11.4 © 3740-5696 [employer]. Care instructions adapted under license by [employer] (which disclaims liability or warranty for this information). If you have questions about a medical condition or this instruction, always ask your healthcare professional. [place] [address] any warranty or liability for your use of this information. Introducing [facility]! Dear Molly Menjivar: 
Thank you for requesting a [employer] account. Our records indicate that you already have an active [employer] account. You can access your account anytime at [email or web address] Did you know that you can access your hospital and ER discharge instructions at any time in [employer]? You can also review all of your test results from your hospital stay or ER visit. Additional Information If you have questions, please visit the Frequently Asked Questions section of the [employer] website at [email or web address] Remember, MyChart is NOT to be used for urgent needs. For medical emergencies, dial 911. Now available from your iPhone and Android! Please provide this summary of care documentation to your next provider. Your primary care clinician is listed as David Pollard. If you have any questions after today's visit, please call [phone].

## 2017-12-09 LAB
25(OH)D3+25(OH)D2 SERPL-MCNC: 27 NG/ML (ref 30–100)
ALBUMIN SERPL-MCNC: 5 G/DL (ref 3.5–5.5)
ALBUMIN/GLOB SERPL: 1.8 {RATIO} (ref 1.2–2.2)
ALP SERPL-CCNC: 101 IU/L (ref 39–117)
ALT SERPL-CCNC: 74 IU/L (ref 0–32)
ANA SER QL: NEGATIVE
AST SERPL-CCNC: 35 IU/L (ref 0–40)
BILIRUB SERPL-MCNC: 0.3 MG/DL (ref 0–1.2)
BUN SERPL-MCNC: 12 MG/DL (ref 6–20)
BUN/CREAT SERPL: 21 (ref 9–23)
CALCIUM SERPL-MCNC: 9.5 MG/DL (ref 8.7–10.2)
CHLORIDE SERPL-SCNC: 105 MMOL/L (ref 96–106)
CHOLEST SERPL-MCNC: 254 MG/DL (ref 100–199)
CO2 SERPL-SCNC: 26 MMOL/L (ref 18–29)
CREAT SERPL-MCNC: 0.57 MG/DL (ref 0.57–1)
ERYTHROCYTE [DISTWIDTH] IN BLOOD BY AUTOMATED COUNT: 13.8 % (ref 12.3–15.4)
ERYTHROCYTE [SEDIMENTATION RATE] IN BLOOD BY WESTERGREN METHOD: 7 MM/HR (ref 0–32)
GFR SERPLBLD CREATININE-BSD FMLA CKD-EPI: 124 ML/MIN/1.73
GFR SERPLBLD CREATININE-BSD FMLA CKD-EPI: 143 ML/MIN/1.73
GLOBULIN SER CALC-MCNC: 2.8 G/DL (ref 1.5–4.5)
GLUCOSE SERPL-MCNC: 99 MG/DL (ref 65–99)
HCT VFR BLD AUTO: 41.4 % (ref 34–46.6)
HDLC SERPL-MCNC: 59 MG/DL
HGB BLD-MCNC: 13.7 G/DL (ref 11.1–15.9)
INTERPRETATION, 910389: NORMAL
LDLC SERPL CALC-MCNC: 174 MG/DL (ref 0–99)
MCH RBC QN AUTO: 29.7 PG (ref 26.6–33)
MCHC RBC AUTO-ENTMCNC: 33.1 G/DL (ref 31.5–35.7)
MCV RBC AUTO: 90 FL (ref 79–97)
PLATELET # BLD AUTO: 198 X10E3/UL (ref 150–379)
POTASSIUM SERPL-SCNC: 4.6 MMOL/L (ref 3.5–5.2)
PROT SERPL-MCNC: 7.8 G/DL (ref 6–8.5)
RBC # BLD AUTO: 4.62 X10E6/UL (ref 3.77–5.28)
SODIUM SERPL-SCNC: 146 MMOL/L (ref 134–144)
TRIGL SERPL-MCNC: 107 MG/DL (ref 0–149)
VLDLC SERPL CALC-MCNC: 21 MG/DL (ref 5–40)
WBC # BLD AUTO: 6 X10E3/UL (ref 3.4–10.8)

## 2017-12-10 DIAGNOSIS — R74.8 ELEVATED LIVER ENZYMES: ICD-10-CM

## 2017-12-10 DIAGNOSIS — E87.0 HYPERNATREMIA: Primary | ICD-10-CM

## 2017-12-14 DIAGNOSIS — E78.5 ELEVATED FASTING LIPID PROFILE: Primary | ICD-10-CM

## 2017-12-14 LAB — HEMOCCULT STL QL IA: NEGATIVE

## 2017-12-28 LAB
ALBUMIN SERPL-MCNC: 4.7 G/DL (ref 3.5–5.5)
ALBUMIN/GLOB SERPL: 2 {RATIO} (ref 1.2–2.2)
ALP SERPL-CCNC: 81 IU/L (ref 39–117)
ALT SERPL-CCNC: 18 IU/L (ref 0–32)
APPEARANCE UR: ABNORMAL
AST SERPL-CCNC: 16 IU/L (ref 0–40)
BACTERIA #/AREA URNS HPF: ABNORMAL /[HPF]
BILIRUB SERPL-MCNC: 0.3 MG/DL (ref 0–1.2)
BILIRUB UR QL STRIP: NEGATIVE
BUN SERPL-MCNC: 9 MG/DL (ref 6–20)
BUN/CREAT SERPL: 15 (ref 9–23)
CALCIUM SERPL-MCNC: 8.9 MG/DL (ref 8.7–10.2)
CASTS URNS QL MICRO: ABNORMAL /LPF
CHLORIDE SERPL-SCNC: 105 MMOL/L (ref 96–106)
CO2 SERPL-SCNC: 23 MMOL/L (ref 18–29)
COLOR UR: YELLOW
CREAT SERPL-MCNC: 0.61 MG/DL (ref 0.57–1)
EPI CELLS #/AREA URNS HPF: >10 /HPF
GLOBULIN SER CALC-MCNC: 2.3 G/DL (ref 1.5–4.5)
GLUCOSE SERPL-MCNC: 92 MG/DL (ref 65–99)
GLUCOSE UR QL: NEGATIVE
HGB UR QL STRIP: NEGATIVE
KETONES UR QL STRIP: NEGATIVE
LEUKOCYTE ESTERASE UR QL STRIP: ABNORMAL
MICRO URNS: ABNORMAL
MUCOUS THREADS URNS QL MICRO: PRESENT
NITRITE UR QL STRIP: NEGATIVE
OSMOLALITY UR: 758 MOSMOL/KG
PH UR STRIP: 5.5 [PH] (ref 5–7.5)
POTASSIUM SERPL-SCNC: 4.2 MMOL/L (ref 3.5–5.2)
PROT SERPL-MCNC: 7 G/DL (ref 6–8.5)
PROT UR QL STRIP: NEGATIVE
RBC #/AREA URNS HPF: ABNORMAL /HPF
SODIUM SERPL-SCNC: 143 MMOL/L (ref 134–144)
SP GR UR: 1.02 (ref 1–1.03)
UROBILINOGEN UR STRIP-MCNC: 0.2 MG/DL (ref 0.2–1)
WBC #/AREA URNS HPF: ABNORMAL /HPF

## 2018-01-06 DIAGNOSIS — K92.1 BLOOD IN STOOL: ICD-10-CM

## 2018-04-05 ENCOUNTER — OFFICE VISIT (OUTPATIENT)
Dept: INTERNAL MEDICINE CLINIC | Age: 33
End: 2018-04-05

## 2018-04-05 VITALS
HEIGHT: 65 IN | DIASTOLIC BLOOD PRESSURE: 77 MMHG | SYSTOLIC BLOOD PRESSURE: 115 MMHG | TEMPERATURE: 98.7 F | BODY MASS INDEX: 25.01 KG/M2 | HEART RATE: 75 BPM | RESPIRATION RATE: 18 BRPM | WEIGHT: 150.13 LBS | OXYGEN SATURATION: 100 %

## 2018-04-05 DIAGNOSIS — E78.2 MIXED HYPERLIPIDEMIA: ICD-10-CM

## 2018-04-05 DIAGNOSIS — R53.83 FATIGUE, UNSPECIFIED TYPE: Primary | ICD-10-CM

## 2018-04-05 DIAGNOSIS — E55.9 VITAMIN D DEFICIENCY: ICD-10-CM

## 2018-04-05 RX ORDER — SPIRONOLACTONE 50 MG/1
TABLET, FILM COATED ORAL
Refills: 12 | COMMUNITY
Start: 2018-01-27 | End: 2018-04-05 | Stop reason: ALTCHOICE

## 2018-04-05 RX ORDER — DAPSONE 50 MG/G
GEL TOPICAL
Refills: 99 | COMMUNITY
Start: 2018-03-27 | End: 2020-07-22 | Stop reason: ALTCHOICE

## 2018-04-05 NOTE — MR AVS SNAPSHOT
[facility] 
 
 
 [address] [place] Orellana 100[address] [facility] 
[phone] Patient: Krystal Paz MRN: [id number] LOM:6/17/1886 Visit Information Date & Time Provider Department Dept. Phone Encounter #  
 4/5/2018 11:40 AM Lyssa Butler MD [facility] [address] [id number] Upcoming Health Maintenance Date Due  
 PAP AKA CERVICAL CYTOLOGY 6/20/2007 DTaP/Tdap/Td series (2 - Td) 7/1/2026 Allergies as of 4/5/2018  Review Complete On: 4/5/2018 By: Lyssa Butler MD  
 No Known Allergies Current Immunizations  Reviewed on 12/7/2017 Name Date Influenza Vaccine 10/30/2017, 9/30/2016 MMR 10/12/2016 11:38 AM  
 Tdap 7/1/2016 Not reviewed this visit You Were Diagnosed With   
  
 Codes Comments Fatigue, unspecified type    -  Primary ICD-10-CM: R53.83 ICD-9-CM: 780.79 Vitamin D deficiency     ICD-10-CM: E55.9 ICD-9-CM: 268.9 Mixed hyperlipidemia     ICD-10-CM: E78.2 ICD-9-CM: 272.2 Vitals BP Pulse Temp Resp Height(growth percentile) Weight(growth percentile) 115/77 (BP 1 Location: Left arm, BP Patient Position: Sitting) 75 98.7 °F (37.1 °C) (Oral) 18 5' 5\" (1.651 m) 150 lb 2 oz (68.1 kg) LMP SpO2 BMI OB Status Smoking Status 03/31/2018 100% 24.98 kg/m2 Having regular periods Never Smoker BMI and BSA Data Body Mass Index Body Surface Area 24.98 kg/m 2 1.77 m 2 Preferred Pharmacy Pharmacy Name Phone [facility] [address] [facility] [place] [phone] Your Updated Medication List  
  
   
This list is accurate as of 4/5/18 12:48 PM.  Always use your most recent med list.  
  
  
  
  
 Dapsone 5 % Gel APPLY TO FACE DAILY  
  
 cgxorjr-eismupexk-lloiqhzvvrdr -325 mg capsule Commonly known as:  Eneida Maha Take 2 tabs at headache onset and repeat every hour x 5 doses.  Max 5 tabs in 24 hours. We Performed the Following MARK W/REFLEX [93763 CPT(R)] CBC W/O DIFF [67045 CPT(R)] METABOLIC PANEL, COMPREHENSIVE [56601 CPT(R)] SED RATE (ESR) [id number] CPT(R)] T4, FREE [id number] CPT(R)] TSH 3RD GENERATION [38975 CPT(R)] VITAMIN D, 25 HYDROXY [id number] CPT(R)] To-Do List   
 04/05/2018 Lab:  LIPID PANEL Introducing [facility] SERVICES! Dear Sandra Malcolm: 
Thank you for requesting a Jag.ag account. Our records indicate that you already have an active Jag.ag account. You can access your account anytime at [email or web address] Did you know that you can access your hospital and ER discharge instructions at any time in Jag.ag? You can also review all of your test results from your hospital stay or ER visit. Additional Information If you have questions, please visit the Frequently Asked Questions section of the [email or web address] website at [email or web address] Remember, Jag.[email or web address] is NOT to be used for urgent needs. For medical emergencies, dial 911. Now available from your iPhone and Android! Please provide this summary of care documentation to your next provider. Your primary care clinician is listed as Wang Chiang. If you have any questions after today's visit, please call [phone].

## 2018-04-05 NOTE — PROGRESS NOTES
Morgan Lindsay is a 33 y.o. female and presents with Fatigue  . Subjective:      Fatigue  Sleeping 7-8 hours  Not well rested when awake up, tea black up to 2 cups/day  No snoring, no bradycardia  Energy level 2 weeks 5/10 baseline 8/10  mirena removed 12/2016  She reports her worry level is under good control    Cardiology  She does wear a fit bit and no bradycardia    constiopation  improved      Migraines  Followed by Dr. Damian Lefort dr. Cinthia Hasten when following with Dr. Li Willoughby   Monitoring  Was offered plaquenil if sx persisted pt defered at the time    Spironolactone   For female hormonal acne    bmi 24  weigth watchers  Good diet and exercising  Vit d 5000 iu/day      Review of Systems  Constitutional: negative for fevers, chills, anorexia and weight loss  Eyes:   negative for visual disturbance and irritation  ENT:   negative for tinnitus,sore throat,nasal congestion,ear pains. hoarseness  Respiratory:  negative for cough, hemoptysis, dyspnea,wheezing  CV:   negative for chest pain, palpitations, lower extremity edema  GI:   negative for nausea, vomiting, diarrhea, abdominal pain,melena  Endo:               negative for polyuria,polydipsia,polyphagia,heat intolerance  Genitourinary: negative for frequency, dysuria and hematuria  Integument:  negative for rash and pruritus  Hematologic:  negative for easy bruising and gum/nose bleeding  Musculoskel: negative for myalgias, arthralgias, back pain, muscle weakness, joint pain  Neurological:  negative for headaches, dizziness, vertigo, memory problems and gait   Behavl/Psych: negative for feelings of anxiety, depression, mood changes    Past Medical History:   Diagnosis Date    Headache      migraines aura    Herpes simplex virus (HSV) infection     oral in genital region; no suppression due to scheduled c/s    Trauma 2013    broken tail bone, VAGINAL delivery     Past Surgical History:   Procedure Laterality Date    HX GYN  10/2016      HX OTHER SURGICAL      wisdom teeth removed     Social History     Social History    Marital status:      Spouse name: N/A    Number of children: N/A    Years of education: N/A     Social History Main Topics    Smoking status: Never Smoker    Smokeless tobacco: None    Alcohol use 0.6 - 1.2 oz/week     1 - 2 Cans of beer per week      Comment: occasional    Drug use: No    Sexual activity: Yes     Partners: Male     Birth control/ protection: None     Other Topics Concern    None     Social History Narrative    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            2 kids    3.6 yo and 4.5 months         Family History   Problem Relation Age of Onset    Headache Mother      htn    Hypertension Mother     Headache Father      heart block pacers    Heart Disease Father     Cancer Maternal Grandmother      breast    Heart Disease Maternal Grandfather     Heart Disease Paternal Grandmother     Heart Disease Paternal Grandfather     Cancer Other      breast     Current Outpatient Prescriptions   Medication Sig Dispense Refill    Dapsone 5 % gel APPLY TO FACE DAILY  99    gzsmijd-infwsuaju-sfsfiksyrbjb (MIDRIN) -325 mg capsule Take 2 tabs at headache onset and repeat every hour x 5 doses. Max 5 tabs in 24 hours.  30 Cap 3     No Known Allergies    Objective:  Visit Vitals    /77 (BP 1 Location: Left arm, BP Patient Position: Sitting)    Pulse 75    Temp 98.7 °F (37.1 °C) (Oral)    Resp 18    Ht 5' 5\" (1.651 m)    Wt 150 lb 2 oz (68.1 kg)    LMP 2018    SpO2 100%    BMI 24.98 kg/m2     Physical Exam:   General appearance - alert, well appearing, and in no distress  Mental status - alert, oriented to person, place, and time  EYE-REG, EOMI, corneas normal, no foreign bodies, visual acuity normal both eyes, no periorbital cellulitis  ENT-ENT exam normal, no neck nodes or sinus tenderness  Nose - normal and patent, no erythema, discharge or polyps  Mouth - mucous membranes moist, pharynx normal without lesions  Neck - supple, no significant adenopathy   Chest - clear to auscultation, no wheezes, rales or rhonchi, symmetric air entry   Heart - normal rate, regular rhythm, normal S1, S2, no murmurs, rubs, clicks or gallops   Abdomen - soft, nontender, nondistended, no masses or organomegaly  Lymph- no adenopathy palpable  Ext-peripheral pulses normal, no pedal edema, no clubbing or cyanosis  Skin-Warm and dry. no hyperpigmentation, vitiligo, or suspicious lesions  Neuro -alert, oriented, normal speech, no focal findings or movement disorder noted  Neck-normal C-spine, no tenderness, full ROM without pain      Results for orders placed or performed in visit on 12/11/17   OCCULT BLOOD, IMMUNOASSAY (FIT)   Result Value Ref Range    Occult blood fecal, by IA Negative Negative     Prevention    Cardiovascular profile  Family hx  Exercising:  Elliptical   Blood pressure:  Health healthy diet:  Diabetes:  Cholesterol:  Renal function:      Cancer risk profile  [facility]  Lung exercise intolerance  Colonoscopy chronic constipation painful docusate  Skin nonhealing in 2 weeks derm in past for acne rosacea  Gyn abnormal bleeding/discharge/abd pain/pressure, Stokes Kei      Thyroid sx    Osteopenia prevention  Calcium 1000mg/day yes  Vitamin D 800iu/day yes    Mental health scale: 9/10  Depression  Anxiety  Sleep # of hours:  Energy Level:        Immunizations  TDAP 2016  Pneumonia vaccine  Flu vaccine  Shingles vaccine  HPV          Diagnoses and all orders for this visit:    1. Fatigue, unspecified type  Waxing and waning sx for some time.  She had when seeing Dr. Yakelin Maher  Will check thyroid  May have smouldering autoimmune condition  She is making good gains behaviorally  -     MARK W/REFLEX  -     CBC W/O DIFF  -     METABOLIC PANEL, COMPREHENSIVE  - SED RATE (ESR)  -     T4, FREE  -     TSH 3RD GENERATION    2. Vitamin D deficiency  Cont meds for now  -     VITAMIN D, 25 HYDROXY    3. Mixed hyperlipidemia  Will recheck lipids in 4-6 months to reassess  -     LIPID PANEL; Future  I spent 25 min with pt and >50% of the time was spent on management and counseling re: ongoing fatiuge and differential.  If not thryoid possible autoimmune and will montior  This note will not be viewable in MyChart.

## 2018-04-06 LAB
25(OH)D3+25(OH)D2 SERPL-MCNC: 27.3 NG/ML (ref 30–100)
ALBUMIN SERPL-MCNC: 4.8 G/DL (ref 3.5–5.5)
ALBUMIN/GLOB SERPL: 1.6 {RATIO} (ref 1.2–2.2)
ALP SERPL-CCNC: 78 IU/L (ref 39–117)
ALT SERPL-CCNC: 19 IU/L (ref 0–32)
ANA SER QL: NEGATIVE
AST SERPL-CCNC: 18 IU/L (ref 0–40)
BILIRUB SERPL-MCNC: 0.4 MG/DL (ref 0–1.2)
BUN SERPL-MCNC: 13 MG/DL (ref 6–20)
BUN/CREAT SERPL: 22 (ref 9–23)
CALCIUM SERPL-MCNC: 9.5 MG/DL (ref 8.7–10.2)
CHLORIDE SERPL-SCNC: 105 MMOL/L (ref 96–106)
CO2 SERPL-SCNC: 24 MMOL/L (ref 18–29)
CREAT SERPL-MCNC: 0.58 MG/DL (ref 0.57–1)
ERYTHROCYTE [DISTWIDTH] IN BLOOD BY AUTOMATED COUNT: 14.1 % (ref 12.3–15.4)
ERYTHROCYTE [SEDIMENTATION RATE] IN BLOOD BY WESTERGREN METHOD: 4 MM/HR (ref 0–32)
GFR SERPLBLD CREATININE-BSD FMLA CKD-EPI: 123 ML/MIN/1.73
GFR SERPLBLD CREATININE-BSD FMLA CKD-EPI: 142 ML/MIN/1.73
GLOBULIN SER CALC-MCNC: 3 G/DL (ref 1.5–4.5)
GLUCOSE SERPL-MCNC: 93 MG/DL (ref 65–99)
HCT VFR BLD AUTO: 37.5 % (ref 34–46.6)
HGB BLD-MCNC: 13 G/DL (ref 11.1–15.9)
MCH RBC QN AUTO: 30.1 PG (ref 26.6–33)
MCHC RBC AUTO-ENTMCNC: 34.7 G/DL (ref 31.5–35.7)
MCV RBC AUTO: 87 FL (ref 79–97)
PLATELET # BLD AUTO: 206 X10E3/UL (ref 150–379)
POTASSIUM SERPL-SCNC: 4.3 MMOL/L (ref 3.5–5.2)
PROT SERPL-MCNC: 7.8 G/DL (ref 6–8.5)
RBC # BLD AUTO: 4.32 X10E6/UL (ref 3.77–5.28)
SODIUM SERPL-SCNC: 143 MMOL/L (ref 134–144)
T4 FREE SERPL-MCNC: 1.24 NG/DL (ref 0.82–1.77)
TSH SERPL DL<=0.005 MIU/L-ACNC: 3.09 UIU/ML (ref 0.45–4.5)
WBC # BLD AUTO: 6.1 X10E3/UL (ref 3.4–10.8)

## 2018-09-07 ENCOUNTER — HOSPITAL ENCOUNTER (OUTPATIENT)
Dept: LAB | Age: 33
Discharge: HOME | End: 2018-09-07

## 2018-09-17 ENCOUNTER — OFFICE VISIT (OUTPATIENT)
Dept: INTERNAL MEDICINE CLINIC | Age: 33
End: 2018-09-17

## 2018-09-17 VITALS
BODY MASS INDEX: 23.66 KG/M2 | TEMPERATURE: 98.1 F | HEART RATE: 90 BPM | WEIGHT: 142 LBS | DIASTOLIC BLOOD PRESSURE: 84 MMHG | OXYGEN SATURATION: 99 % | RESPIRATION RATE: 16 BRPM | HEIGHT: 65 IN | SYSTOLIC BLOOD PRESSURE: 130 MMHG

## 2018-09-17 DIAGNOSIS — M54.2 NECK PAIN: ICD-10-CM

## 2018-09-17 DIAGNOSIS — R53.83 OTHER FATIGUE: Primary | ICD-10-CM

## 2018-09-17 RX ORDER — CITALOPRAM 10 MG/1
10 TABLET ORAL DAILY
Qty: 30 TAB | Refills: 0 | Status: SHIPPED | OUTPATIENT
Start: 2018-09-17 | End: 2018-10-14 | Stop reason: SDUPTHER

## 2018-09-17 NOTE — PROGRESS NOTES
Chief Complaint   Patient presents with    Fatigue     follow up    Other     issues with swallowing     1. Have you been to the ER, urgent care clinic since your last visit? Hospitalized since your last visit? No    2. Have you seen or consulted any other health care providers outside of the [address] [place] [address] since your last visit? Include any pap smears or colon screening.  No

## 2018-09-17 NOTE — PROGRESS NOTES
Rock Parada is a 29 y.o. female and presents with Fatigue (follow up) and Other (issues with swallowing)  . Subjective:  Patient presents for follow-up with regards to her fatigue and left lymph node. Neck pain  Patient reports she feels a catching sensation on the upper left neck. She notes when turning to her right she feels a clicking kind of sensation. She denies fever night sweats. She has a history of enlarged lymph nodes when she had strep as a child. Fatigue  Waxes and wanes  She is still Sleeping 7-8 hours  Not well rested when awake up, tea black up to 2 cups/day but then has increased to 3 per day with no help. No snoring, no bradycardia  Energy level 2 weeks 4-6/10 baseline 8/10  mirena removed 12/2016  She reports her worry level is under good control. She reports her  notes she is a little bit more irritable  Target symptoms:   She feels she is in a fog  Fatigue, irritibility, concentration  4-5/10, irritible 7 days/week, 7/10 concentration      Cardiology  She does wear a fit bit and no bradycardia. She notes that she has periods where her heart rate is variable and can go to 120. She is being followed by her cardiologist.    [place] Call   Seen dr. Yuli Reilly when following with Dr. Kramer Bam   Was offered plaquenil if sx persisted pt defered at the time      bmi 24  Vit d 5000 iu/day      Review of Systems  Constitutional: negative for fevers, chills, anorexia and weight loss  Eyes:   negative for visual disturbance and irritation  ENT:   negative for tinnitus,sore throat,nasal congestion,ear pains. hoarseness  Respiratory:  negative for cough, hemoptysis, dyspnea,wheezing  CV:   negative for chest pain, palpitations, lower extremity edema  GI:   negative for nausea, vomiting, diarrhea, abdominal pain,melena  Endo:               negative for polyuria,polydipsia,polyphagia,heat intolerance  Genitourinary: negative for frequency, dysuria and hematuria  Integument:  negative for rash and pruritus  Hematologic:  negative for easy bruising and gum/nose bleeding  Musculoskel: negative for myalgias, arthralgias, back pain, muscle weakness, joint pain  Neurological:  negative for headaches, dizziness, vertigo, memory problems and gait   Behavl/Psych: negative for feelings of anxiety, depression, mood changes    Past Medical History:   Diagnosis Date    Headache      migraines aura    Herpes simplex virus (HSV) infection     oral in genital region; no suppression due to scheduled c/s    Trauma     broken tail bone, VAGINAL delivery     Past Surgical History:   Procedure Laterality Date    HX GYN  10/2016         HX OTHER SURGICAL      wisdom teeth removed     Social History     Social History    Marital status:      Spouse name: N/A    Number of children: N/A    Years of education: N/A     Social History Main Topics    Smoking status: Never Smoker    Smokeless tobacco: None    Alcohol use 0.6 - 1.2 oz/week     1 - 2 Cans of beer per week      Comment: occasional    Drug use: No    Sexual activity: Yes     Partners: Male     Birth control/ protection: None     Other Topics Concern    None     Social History Narrative    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 [facility] [facility] health            2 kids    3.4 yo and 4.5 months         Family History   Problem Relation Age of Onset    Headache Mother      htn    Hypertension Mother     Headache Father      heart block pacers    Heart Disease Father     Cancer Maternal Grandmother      breast    Heart Disease Maternal Grandfather     Heart Disease Paternal Grandmother     Heart Disease Paternal Grandfather     Cancer Other      breast     Current Outpatient Prescriptions   Medication Sig Dispense Refill    Dapsone 5 % gel APPLY TO FACE DAILY  99    vrnsgyp-hymffpiuk-utporsxbqtnl (MIDRIN) -325 mg capsule Take 2 tabs at headache onset and repeat every hour x 5 doses. Max 5 tabs in 24 hours. 30 Cap 3     No Known Allergies    Objective:  Visit Vitals    /87    Pulse 90    Temp 98.1 °F (36.7 °C) (Oral)    Resp 16    Ht 5' 5\" (1.651 m)    Wt 142 lb (64.4 kg)    LMP 08/26/2018 (Exact Date)    SpO2 99%    BMI 23.63 kg/m2     Physical Exam:   General appearance - alert, well appearing, and in no distress  Mental status - alert, oriented to person, place, and time  EYE-REG, EOMI, corneas normal, no foreign bodies, visual acuity normal both eyes, no periorbital cellulitis  ENT-ENT exam normal, no neck nodes or sinus tenderness  Nose - normal and patent, no erythema, discharge or polyps  Mouth - mucous membranes moist, pharynx normal without lesions  Neck - supple, no significant adenopathy   Chest - clear to auscultation, no wheezes, rales or rhonchi, symmetric air entry   Heart - normal rate, regular rhythm, normal S1, S2, no murmurs, rubs, clicks or gallops   Abdomen - soft, nontender, nondistended, no masses or organomegaly  Lymph- no adenopathy palpable  Ext-peripheral pulses normal, no pedal edema, no clubbing or cyanosis  Skin-Warm and dry.  no hyperpigmentation, vitiligo, or suspicious lesions  Neuro -alert, oriented, normal speech, no focal findings or movement disorder noted  Neck-normal C-spine, no tenderness, full ROM without pain      Results for orders placed or performed in visit on 04/05/18   MARK W/REFLEX   Result Value Ref Range    Antinuclear Antibodies Direct Negative Negative   CBC W/O DIFF   Result Value Ref Range    WBC 6.1 3.4 - 10.8 x10E3/uL    RBC 4.32 3.77 - 5.28 x10E6/uL    HGB 13.0 11.1 - 15.9 g/dL    HCT 37.5 34.0 - 46.6 %    MCV 87 79 - 97 fL    MCH 30.1 26.6 - 33.0 pg    MCHC 34.7 31.5 - 35.7 g/dL    RDW 14.1 12.3 - 15.4 %    PLATELET 453 453 - 865 R99Z2/QD   METABOLIC PANEL, COMPREHENSIVE   Result Value Ref Range    Glucose 93 65 - 99 mg/dL    BUN 13 6 - 20 mg/dL    Creatinine 0.58 0.57 - 1.00 mg/dL    GFR est non- >59 mL/min/1.73    GFR est  >59 mL/min/1.73    BUN/Creatinine ratio 22 9 - 23    Sodium 143 134 - 144 mmol/L    Potassium 4.3 3.5 - 5.2 mmol/L    Chloride 105 96 - 106 mmol/L    CO2 24 18 - 29 mmol/L    Calcium 9.5 8.7 - 10.2 mg/dL    Protein, total 7.8 6.0 - 8.5 g/dL    Albumin 4.8 3.5 - 5.5 g/dL    GLOBULIN, TOTAL 3.0 1.5 - 4.5 g/dL    A-G Ratio 1.6 1.2 - 2.2    Bilirubin, total 0.4 0.0 - 1.2 mg/dL    Alk. phosphatase 78 39 - 117 IU/L    AST (SGOT) 18 0 - 40 IU/L    ALT (SGPT) 19 0 - 32 IU/L   SED RATE (ESR)   Result Value Ref Range    Sed rate (ESR) 4 0 - 32 mm/hr   T4, FREE   Result Value Ref Range    T4, Free 1.24 0.82 - 1.77 ng/dL   TSH 3RD GENERATION   Result Value Ref Range    TSH 3.090 0.450 - 4.500 uIU/mL   VITAMIN D, 25 HYDROXY   Result Value Ref Range    VITAMIN D, 25-HYDROXY 27.3 (L) 30.0 - 100.0 ng/mL     Prevention    Cardiovascular profile  Family hx  Exercising:  Elliptical   Blood pressure:  Health healthy diet:  Diabetes:  Cholesterol:  Renal function:      Cancer risk profile  [facility]  Lung exercise intolerance  Colonoscopy chronic constipation painful docusate  Skin nonhealing in 2 weeks derm in past for acne rosacea  Gyn abnormal bleeding/discharge/abd pain/pressure, Fredrica Beat      Thyroid sx    Osteopenia prevention  Calcium 1000mg/day yes  Vitamin D 800iu/day yes    Mental health scale: 9/10  Depression  Anxiety  Sleep # of hours:  Energy Level:        Immunizations  TDAP 2016  Pneumonia vaccine  Flu vaccine  Shingles vaccine  HPV          Diagnoses and all orders for this visit:    1. Other fatigue  Patient reports persistent fatigue from last visit. Labs reviewed with patient again. She is sleeping about 7-8 hours per night but her energy level is about a 4-6. I did a JANE 7 and she scored 13 I did a PHQ 9 and she scored 10. Target symptoms as noted above.   We will try her on the low-dose Celexa. If no improvement will transition her to BuSpar for JANE. -     citalopram (CELEXA) 10 mg tablet; Take 1 Tab by mouth daily. I will check for celiac disease if sx still not improving as well. 2. Neck pain  Not resolving will ultrasound to ensure within normal limits  -     US THYROID/PARATHYROID/SOFT TISS; Future        I spent 25 minutes with patient and greater than 50% of the time was spent in management and counseling of patient with regards to her fatigue she did score positive on the JANE and PHQ 9 scale. I will treat her with a low dose of Celexa 5 mg p.o. daily to see if she responds. I would also like for her to see Yan Mina at [facility]. This note will not be viewable in [address]

## 2018-09-17 NOTE — MR AVS SNAPSHOT
Titus [facility]zant 
 
 
 [address] [facility] 
[phone] Patient: Angela Cheek MRN: [id number] EKI:[birth year] Visit Information Date & Time Provider Department Dept. Phone Encounter #  
 9/17/2018 11:40 AM Sheryl Martinez MD [facility] [address] [id number] Upcoming Health Maintenance Date Due  
 PAP AKA CERVICAL CYTOLOGY 6/20/2007 Influenza Age 5 to Adult 8/1/2018 DTaP/Tdap/Td series (2 - Td) 7/1/2026 Allergies as of 9/17/2018  Review Complete On: 9/17/2018 By: Sheryl Martinez MD  
 No Known Allergies Current Immunizations  Reviewed on 12/7/2017 Name Date Influenza Vaccine 10/30/2017, 9/30/2016 MMR 10/12/2016 11:38 AM  
 Tdap 7/1/2016 Not reviewed this visit You Were Diagnosed With   
  
 Codes Comments Other fatigue    -  Primary ICD-10-CM: R53.83 ICD-9-CM: 780.79 Neck pain     ICD-10-CM: M54.2 ICD-9-CM: 723.1 Vitals BP Pulse Temp Resp Height(growth percentile) Weight(growth percentile) 144/87 90 98.1 °F (36.7 °C) (Oral) 16 5' 5\" (1.651 m) 142 lb (64.4 kg) LMP SpO2 BMI OB Status Smoking Status 08/26/2018 (Exact Date) 99% 23.63 kg/m2 Having regular periods Never Smoker Vitals History BMI and BSA Data Body Mass Index Body Surface Area  
 23.63 kg/m 2 1.72 m 2 Preferred Pharmacy Pharmacy Name Phone [id number] [place] [phone] Your Updated Medication List  
  
   
This list is accurate as of 9/17/18 12:30 PM.  Always use your most recent med list.  
  
  
  
  
 citalopram 10 mg tablet Commonly known as:  Claribel Cole Take 1 Tab by mouth daily. Dapsone 5 % Gel APPLY TO FACE DAILY  
  
 uyixwnq-yxnjmvumz-pluyulwtupcj -325 mg capsule Commonly known as:  Gladystine Sushil  
 Take 2 tabs at headache onset and repeat every hour x 5 doses. Max 5 tabs in 24 hours. Prescriptions Sent to Pharmacy Refills  
 citalopram (CELEXA) 10 mg tablet 0 Sig: Take 1 Tab by mouth daily. Class: Normal  
 Pharmacy: [facility] #: [phone] Route: Oral  
  
To-Do List   
 09/17/2018 Imaging:  US THYROID/PARATHYROID/SOFT TISS Introducing \[facility]\"" & HEALTH SERVICES! Dear Kassi Cesar: 
Thank you for requesting a Expert Planet account. Our records indicate that you already have an active Expert Planet account. You can access your account anytime at [email or web address] Did you know that you can access your hospital and ER discharge instructions at any time in Expert Planet? You can also review all of your test results from your hospital stay or ER visit. Additional Information If you have questions, please visit the Frequently Asked Questions section of the Expert Planet website at [email or web address] Remember, Expert Planet is NOT to be used for urgent needs. For medical emergencies, dial 911. Now available from your iPhone and Android! Please provide this summary of care documentation to your next provider. Your primary care clinician is listed as Ricki Ruiz. If you have any questions after today's visit, please call [phone].

## 2018-09-28 ENCOUNTER — HOSPITAL ENCOUNTER (OUTPATIENT)
Dept: ULTRASOUND IMAGING | Age: 33
Discharge: HOME | End: 2018-09-28
Attending: INTERNAL MEDICINE
Payer: COMMERCIAL

## 2018-09-28 DIAGNOSIS — M54.2 NECK PAIN: ICD-10-CM

## 2018-09-28 PROCEDURE — 76536 US EXAM OF HEAD AND NECK: CPT

## 2018-10-14 DIAGNOSIS — R53.83 OTHER FATIGUE: ICD-10-CM

## 2018-10-15 RX ORDER — CITALOPRAM 10 MG/1
TABLET ORAL
Qty: 30 TAB | Refills: 0 | Status: SHIPPED | OUTPATIENT
Start: 2018-10-15 | End: 2018-10-16 | Stop reason: SDUPTHER

## 2018-10-16 ENCOUNTER — OFFICE VISIT (OUTPATIENT)
Dept: INTERNAL MEDICINE CLINIC | Age: 33
End: 2018-10-16

## 2018-10-16 VITALS
HEIGHT: 65 IN | SYSTOLIC BLOOD PRESSURE: 113 MMHG | TEMPERATURE: 98.1 F | OXYGEN SATURATION: 98 % | DIASTOLIC BLOOD PRESSURE: 89 MMHG | HEART RATE: 89 BPM | RESPIRATION RATE: 18 BRPM | BODY MASS INDEX: 23.66 KG/M2 | WEIGHT: 142 LBS

## 2018-10-16 DIAGNOSIS — E04.1 THYROID NODULE: ICD-10-CM

## 2018-10-16 DIAGNOSIS — F34.1 DYSTHYMIA: ICD-10-CM

## 2018-10-16 DIAGNOSIS — F41.9 ANXIETY: Primary | ICD-10-CM

## 2018-10-16 DIAGNOSIS — R53.83 OTHER FATIGUE: ICD-10-CM

## 2018-10-16 RX ORDER — FLUCONAZOLE 150 MG/1
TABLET ORAL
Refills: 2 | COMMUNITY
Start: 2018-09-10 | End: 2018-10-16 | Stop reason: ALTCHOICE

## 2018-10-16 RX ORDER — NYSTATIN AND TRIAMCINOLONE ACETONIDE 100000; 1 [USP'U]/G; MG/G
CREAM TOPICAL
Refills: 0 | COMMUNITY
Start: 2018-09-10 | End: 2020-01-20 | Stop reason: ALTCHOICE

## 2018-10-16 RX ORDER — CITALOPRAM 20 MG/1
TABLET, FILM COATED ORAL
Qty: 30 TAB | Refills: 1 | Status: SHIPPED | OUTPATIENT
Start: 2018-10-16 | End: 2018-11-12 | Stop reason: ALTCHOICE

## 2018-10-16 RX ORDER — BUSPIRONE HYDROCHLORIDE 5 MG/1
5 TABLET ORAL
Qty: 10 TAB | Refills: 0 | Status: SHIPPED | OUTPATIENT
Start: 2018-10-16 | End: 2018-10-18

## 2018-10-16 RX ORDER — ASPIRIN 325 MG
50000 TABLET, DELAYED RELEASE (ENTERIC COATED) ORAL
Qty: 8 CAP | Refills: 0 | Status: SHIPPED | OUTPATIENT
Start: 2018-10-16 | End: 2020-07-22 | Stop reason: ALTCHOICE

## 2018-10-16 NOTE — PROGRESS NOTES
Kristal Jaeger is a 29 y.o. female and presents with Follow-up (Fatigue, Migraines)  . Subjective:      Patient presents for follow-up with regards to his symptoms of fatigue and headache. Fatigue  Patient reports her fatigue is actually a little bit better. She is still Sleeping 7-8 hours, now sleeping at least 8 hours, more well rested, doing 1 cup of black tea and soda. She has eliminated other caffeine sources. Her energy level has improved. Energy level 2 weeks 4-6/10 now 7-8/10  baseline 8/10  mirena removed 12/2016, she now has paraguard  She reports her worry level is under good control. She reports her  is noticing that she is less irritable at home. Target symptoms:   She notes her fog that symptoms have lifted  Fatigue, irritibility, concentration have improved  e 7 days/week, 7/10 concentration    Anxiety   Dampened down  Brain fog starting to lift    Palpitations  She does wear a fit bit and no bradycardia. She notes that she has periods where her heart rate is variable and can go to 120. She reports that she is still having periods of fluttering in her heart. She has not followed up with her cardiologist yet . Migraines  Stable    Thyroid nodule  Thyroid labs were checked back in April and were within normal limits. She reports she does have some swelling in her neck. She would like to be seen by Dr. Annette Sanches however we will not be able to see her until January. Alice Sanchez   Seen dr. Christian Paiz when following with Dr. Darrell Cesar   Was offered plaquenil if sx persisted pt defered at the time      bmi 24  Vit d 5000 iu/day      Review of Systems  Constitutional: negative for fevers, chills, anorexia and weight loss  Eyes:   negative for visual disturbance and irritation  ENT:   negative for tinnitus,sore throat,nasal congestion,ear pains. hoarseness  Respiratory:  negative for cough, hemoptysis, dyspnea,wheezing  CV:   negative for chest pain, palpitations, lower extremity edema  GI: negative for nausea, vomiting, diarrhea, abdominal pain,melena  Endo:               negative for polyuria,polydipsia,polyphagia,heat intolerance  Genitourinary: negative for frequency, dysuria and hematuria  Integument:  negative for rash and pruritus  Hematologic:  negative for easy bruising and gum/nose bleeding  Musculoskel: negative for myalgias, arthralgias, back pain, muscle weakness, joint pain  Neurological:  negative for headaches, dizziness, vertigo, memory problems and gait   Behavl/Psych: negative for feelings of anxiety, depression, mood changes    Past Medical History:   Diagnosis Date    Headache      migraines aura    Herpes simplex virus (HSV) infection     oral in genital region; no suppression due to scheduled c/s    Trauma     broken tail bone, VAGINAL delivery     Past Surgical History:   Procedure Laterality Date    HX GYN  10/2016         HX OTHER SURGICAL      wisdom teeth removed     Social History     Social History    Marital status:      Spouse name: N/A    Number of children: N/A    Years of education: N/A     Social History Main Topics    Smoking status: Never Smoker    Smokeless tobacco: None    Alcohol use 0.6 - 1.2 oz/week     1 - 2 Cans of beer per week      Comment: occasional    Drug use: No    Sexual activity: Yes     Partners: Male     Birth control/ protection: None     Other Topics Concern    None     Social History Narrative    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    Did [facility] health            2 kids    3.4 yo and 4.5 months         Family History   Problem Relation Age of Onset    Headache Mother      htn    Hypertension Mother     Headache Father      heart block pacers    Heart Disease Father     Cancer Maternal Grandmother      breast    Heart Disease Maternal Grandfather     Heart Disease Paternal Grandmother     Heart Disease Paternal Grandfather     Cancer Other      breast     Current Outpatient Prescriptions   Medication Sig Dispense Refill    citalopram (CELEXA) 10 mg tablet TAKE 1 TABLET BY MOUTH EVERY DAY 30 Tab 0    fluconazole (DIFLUCAN) 150 mg tablet TAKE 1 TABLET BY MOUTH NOW AND REPEAT IN 3 DAYS  2    nystatin-triamcinolone (MYCOLOG II) topical cream APPLY EXTERNALLY TWICE A DAY FOR 14 DAYS  0    Dapsone 5 % gel APPLY TO FACE DAILY  99    ytpoxna-kdyaafane-lvlttwdepeiz (MIDRIN) -325 mg capsule Take 2 tabs at headache onset and repeat every hour x 5 doses. Max 5 tabs in 24 hours. 30 Cap 3     No Known Allergies    Objective:  Visit Vitals    /89 (BP 1 Location: Left arm, BP Patient Position: Sitting)    Pulse 89    Temp 98.1 °F (36.7 °C) (Oral)    Resp 18    Ht 5' 5\" (1.651 m)    Wt 142 lb (64.4 kg)    LMP 09/27/2018 (Exact Date)    SpO2 98%    BMI 23.63 kg/m2     Physical Exam:   General appearance - alert, well appearing, and in no distress  Mental status - alert, oriented to person, place, and time  EYE-REG, EOMI, corneas normal, no foreign bodies, visual acuity normal both eyes, no periorbital cellulitis  ENT-ENT exam normal, no neck nodes or sinus tenderness  Nose - normal and patent, no erythema, discharge or polyps  Mouth - mucous membranes moist, pharynx normal without lesions  Neck - supple, no significant adenopathy   Chest - clear to auscultation, no wheezes, rales or rhonchi, symmetric air entry   Heart - normal rate, regular rhythm, normal S1, S2, no murmurs, rubs, clicks or gallops   Abdomen - soft, nontender, nondistended, no masses or organomegaly  Lymph- no adenopathy palpable  Ext-peripheral pulses normal, no pedal edema, no clubbing or cyanosis  Skin-Warm and dry.  no hyperpigmentation, vitiligo, or suspicious lesions  Neuro -alert, oriented, normal speech, no focal findings or movement disorder noted  Neck-normal C-spine, no tenderness, full ROM without pain      Results for orders placed or performed in visit on 04/05/18   MARK W/REFLEX   Result Value Ref Range    Antinuclear Antibodies Direct Negative Negative   CBC W/O DIFF   Result Value Ref Range    WBC 6.1 3.4 - 10.8 x10E3/uL    RBC 4.32 3.77 - 5.28 x10E6/uL    HGB 13.0 11.1 - 15.9 g/dL    HCT 37.5 34.0 - 46.6 %    MCV 87 79 - 97 fL    MCH 30.1 26.6 - 33.0 pg    MCHC 34.7 31.5 - 35.7 g/dL    RDW 14.1 12.3 - 15.4 %    PLATELET 446 751 - 199 [id number]   METABOLIC PANEL, COMPREHENSIVE   Result Value Ref Range    Glucose 93 65 - 99 mg/dL    BUN 13 6 - 20 mg/dL    Creatinine 0.58 0.57 - 1.00 mg/dL    GFR est non- >59 mL/min/1.73    GFR est  >59 mL/min/1.73    BUN/Creatinine ratio 22 9 - 23    Sodium 143 134 - 144 mmol/L    Potassium 4.3 3.5 - 5.2 mmol/L    Chloride 105 96 - 106 mmol/L    CO2 24 18 - 29 mmol/L    Calcium 9.5 8.7 - 10.2 mg/dL    Protein, total 7.8 6.0 - 8.5 g/dL    Albumin 4.8 3.5 - 5.5 g/dL    GLOBULIN, TOTAL 3.0 1.5 - 4.5 g/dL    A-G Ratio 1.6 1.2 - 2.2    Bilirubin, total 0.4 0.0 - 1.2 mg/dL    Alk.  phosphatase 78 39 - 117 IU/L    AST (SGOT) 18 0 - 40 IU/L    ALT (SGPT) 19 0 - 32 IU/L   SED RATE (ESR)   Result Value Ref Range    Sed rate (ESR) 4 0 - 32 mm/hr   T4, FREE   Result Value Ref Range    T4, Free 1.24 0.82 - 1.77 ng/dL   TSH 3RD GENERATION   Result Value Ref Range    TSH 3.090 0.450 - 4.500 uIU/mL   VITAMIN D, 25 HYDROXY   Result Value Ref Range    VITAMIN D, 25-HYDROXY 27.3 (L) 30.0 - 100.0 ng/mL     Prevention    Cardiovascular profile  Family hx  Exercising:  Elliptical   Blood pressure:  Health healthy diet:  Diabetes:  Cholesterol:  Renal function:      Cancer risk profile  [facility]  Lung exercise intolerance  Colonoscopy chronic constipation painful docusate  Skin nonhealing in 2 weeks derm in past for acne rosacea  Gyn abnormal bleeding/discharge/abd pain/pressure, Jensen Balint      Thyroid sx    Osteopenia prevention  Calcium 1000mg/day yes  Vitamin D 800iu/day yes    Mental health scale: 9/10  Depression  Anxiety  Sleep # of hours:  Energy Level:        Immunizations  TDAP 2016  Pneumonia vaccine  Flu vaccine  Shingles vaccine  HPV      Diagnoses and all orders for this visit:    1. Anxiety  I think this is improving on Celexa we will increase to 20 mg. She will my chart me in about a month to let me know how she is doing on the 20 mg.  I think the BuSpar can be added on as needed to help with periods of time when there is more anxiety. She will let me know if she is using any BuSpar 2. I asked her to do a JANE 7 score when she is on the 20 mg she will let me know on my chart with that is she was 10 at her last visit. 2. Other fatigue  I think her fatigue may be a manifestation of some of her anxiety and depression. But this is improving. We should see an improvement with her increase in Celexa as well. I have talked to her about potential weight gain and she is watching this she is eating a protein-based diet  -     VITAMIN D, 25 HYDROXY; Future  -     citalopram (CELEXA) 20 mg tablet; TAKE 1 TABLET BY MOUTH EVERY DAY  Indications: Generalized Anxiety Disorder    3. Dysthymia  Also improving with Celexa  We will try her on higher dose of vitamin D. Her goal may be vitamin D level of 40. Thyroid nodule  Patient will be seen by endocrine for nodule aspiration. She will see Dr. Areli Mariano or Dr. Jose Rivas if available    Other orders  -     cholecalciferol (VITAMIN D3) 50,000 unit capsule; Take 1 Cap by mouth every seven (7) days. -     busPIRone (BUSPAR) 5 mg tablet; Take 1 Tab by mouth daily as needed. This note will not be viewable in [address]

## 2018-10-16 NOTE — PROGRESS NOTES
All health maintenance and other pertinent information has been reviewed in preparation for today's office visit. Patient presents in the office today for:    Chief Complaint   Patient presents with    Follow-up     Fatigue, Migraines       1. Have you been to the ER, urgent care clinic since your last visit? Hospitalized since your last visit? No    2. Have you seen or consulted any other health care providers outside of the [address] [place] [address] since your last visit? Include any pap smears or colon screening.  No

## 2018-10-16 NOTE — MR AVS SNAPSHOT
[facility] 
 
 
 [address] [place], [place] [id number] Patient: Rodney Mendez MRN: [id number] MNY:3/14/1935 Visit Information Date & Time Provider Department Dept. Phone Encounter #  
 10/16/2018  1:40 PM Compa Mehta MD [facility] [id number] Your Appointments 1/25/2019  1:30 PM  
New Patient with MD Haider Dodsonmond [facility]) Appt Note: Np; thyroid nodules; referred by Dr. Campbell Jerome [phone]; Np; thyroid nodules; referred by Dr. Campbell Jerome [phone]  
 [address] [address]  
[phone]  
  
   
 [address] [address] Upcoming Health Maintenance Date Due  
 PAP AKA CERVICAL CYTOLOGY 6/20/2007 Influenza Age 5 to Adult 8/1/2018 DTaP/Tdap/Td series (2 - Td) 7/1/2026 Allergies as of 10/16/2018  Review Complete On: 10/16/2018 By: Compa Mehta MD  
 No Known Allergies Current Immunizations  Reviewed on 12/7/2017 Name Date Influenza Vaccine 10/30/2017, 9/30/2016 MMR 10/12/2016 11:38 AM  
 Tdap 7/1/2016 Not reviewed this visit You Were Diagnosed With   
  
 Codes Comments Other fatigue     ICD-10-CM: R53.83 ICD-9-CM: 780.79 Vitals BP Pulse Temp Resp Height(growth percentile) Weight(growth percentile) 113/89 (BP 1 Location: Left arm, BP Patient Position: Sitting) 89 98.1 °F (36.7 °C) (Oral) 18 5' 5\" (1.651 m) 142 lb (64.4 kg) LMP SpO2 BMI OB Status Smoking Status 09/27/2018 (Exact Date) 98% 23.63 kg/m2 Having regular periods Never Smoker Vitals History BMI and BSA Data Body Mass Index Body Surface Area  
 23.63 kg/m 2 1.72 m 2 Preferred Pharmacy Pharmacy Name Phone [id number], [address] [facility] [phone] Your Updated Medication List  
  
   
This list is accurate as of 10/16/18  2:46 PM.  Always use your most recent med list.  
  
  
  
  
 busPIRone 5 mg tablet Commonly known as:  BUSPAR Take 1 Tab by mouth daily as needed. cholecalciferol 50,000 unit capsule Commonly known as:  VITAMIN D3 Take 1 Cap by mouth every seven (7) days. citalopram 20 mg tablet Commonly known as:  CELEXA  
TAKE 1 TABLET BY MOUTH EVERY DAY  Indications: Generalized Anxiety Disorder Dapsone 5 % Gel APPLY TO FACE DAILY  
  
 nystatin-triamcinolone topical cream  
Commonly known as:  MYCOLOG II  
APPLY EXTERNALLY TWICE A DAY FOR 14 DAYS Prescriptions Printed Refills  
 busPIRone (BUSPAR) 5 mg tablet 0 Sig: Take 1 Tab by mouth daily as needed. Class: Print Route: Oral  
  
Prescriptions Sent to Pharmacy Refills  
 cholecalciferol (VITAMIN D3) 50,000 unit capsule 0 Sig: Take 1 Cap by mouth every seven (7) days. Class: Normal  
 Pharmacy: [facility] Ph #: [phone] Route: Oral  
 citalopram (CELEXA) 20 mg tablet 1 Sig: TAKE 1 TABLET BY MOUTH EVERY DAY  Indications: Generalized Anxiety Disorder Class: Normal  
 Pharmacy: [facility] Ph #: [phone] To-Do List   
 01/16/2019 Lab:  VITAMIN D, 25 HYDROXY Introducing [facility] SERVICES! Dear Radha Lockhart: 
Thank you for requesting a [employer] account. Our records indicate that you already have an active [employer] account. You can access your account anytime at [email or web address] Did you know that you can access your hospital and ER discharge instructions at any time in [employer]? You can also review all of your test results from your hospital stay or ER visit. Additional Information If you have questions, please visit the Frequently Asked Questions section of the [employer] website at [email or web address] Remember, [employer] is NOT to be used for urgent needs. For medical emergencies, dial 911. Now available from your iPhone and Android! Please provide this summary of care documentation to your next provider. Your primary care clinician is listed as hCiara Urias. If you have any questions after today's visit, please call [phone].

## 2018-10-18 ENCOUNTER — TELEPHONE (OUTPATIENT)
Dept: INTERNAL MEDICINE CLINIC | Age: 33
End: 2018-10-18

## 2018-10-18 RX ORDER — BUSPIRONE HYDROCHLORIDE 10 MG/1
5 TABLET ORAL
Qty: 30 TAB | Refills: 0 | Status: SHIPPED | OUTPATIENT
Start: 2018-10-18 | End: 2020-01-20 | Stop reason: SDUPTHER

## 2018-10-18 NOTE — TELEPHONE ENCOUNTER
Pharmacy faxed over information stating pt's Buspirone HCL 5mg tablet is on backorder with no release date. Please advise if there is an alternative or if pt needs to call around to other pharmacies.

## 2018-12-09 DIAGNOSIS — R53.83 OTHER FATIGUE: ICD-10-CM

## 2018-12-09 RX ORDER — CITALOPRAM 20 MG/1
TABLET, FILM COATED ORAL
Qty: 30 TAB | Refills: 1 | Status: SHIPPED | OUTPATIENT
Start: 2018-12-09 | End: 2019-01-02 | Stop reason: SDUPTHER

## 2019-01-02 ENCOUNTER — OFFICE VISIT (OUTPATIENT)
Dept: INTERNAL MEDICINE CLINIC | Age: 34
End: 2019-01-02

## 2019-01-02 VITALS
BODY MASS INDEX: 24.16 KG/M2 | HEART RATE: 85 BPM | SYSTOLIC BLOOD PRESSURE: 104 MMHG | RESPIRATION RATE: 12 BRPM | DIASTOLIC BLOOD PRESSURE: 65 MMHG | HEIGHT: 65 IN | TEMPERATURE: 99 F | WEIGHT: 145 LBS

## 2019-01-02 DIAGNOSIS — E55.9 VITAMIN D DEFICIENCY: ICD-10-CM

## 2019-01-02 DIAGNOSIS — R53.83 OTHER FATIGUE: ICD-10-CM

## 2019-01-02 DIAGNOSIS — C73 THYROID CANCER (HCC): Primary | ICD-10-CM

## 2019-01-02 RX ORDER — CITALOPRAM 20 MG/1
TABLET, FILM COATED ORAL
Qty: 90 TAB | Refills: 1 | Status: SHIPPED | OUTPATIENT
Start: 2019-01-02 | End: 2019-06-30 | Stop reason: SDUPTHER

## 2019-01-02 RX ORDER — LEVOTHYROXINE SODIUM 100 UG/1
TABLET ORAL
COMMUNITY
End: 2019-01-29 | Stop reason: SDUPTHER

## 2019-01-02 RX ORDER — LORATADINE 10 MG/1
10 TABLET ORAL
COMMUNITY
End: 2020-07-22 | Stop reason: ALTCHOICE

## 2019-01-02 RX ORDER — FLUTICASONE PROPIONATE 50 MCG
2 SPRAY, SUSPENSION (ML) NASAL DAILY
COMMUNITY

## 2019-01-02 NOTE — PROGRESS NOTES
Jailyn Barger is a 29 y.o. female and presents with Anxiety (review medications) and Thyroid Problem  . Subjective:    SUBJECTIVE: Jailyn Barger is a 29 y.o. female here for follow up of thyroid cancer. Patient reports she recently had thyroidectomy by Dr. Yeni Ratliff at [address] [place] [address]. Lab Results   Component Value Date/Time    TSH 3.090 04/05/2018 01:05 PM     Thyroid ROS: fatigue, weight gain and constipation. Patient is currently on levothyroxine 100 mcg. She reports she will be hypothyroid and then will start radioactive iodine and then will move to hyperthyroidism. She reports she is constipation. She also has fatigue which she feels is different from her regular fatigue related to her anxiety. She will see her endocrinologist on January 25        Fatigue  Feels like getting better  Anxiety wise doing pretty well  Patient reports her fatigue is actually a little bit better. She is still Sleeping 7-8 hours, now sleeping at least 8 hours, more well rested, doing 1 cup of black tea and soda. She has eliminated other caffeine sources. mirena removed 12/2016, she now has paraguard  She reports her worry level is under good control. She reports her  is noticing that she is less irritable at home. She is currently on Celexa 20 mg. She reports she feels pretty stable on this. She would like to continue it as she is having a lot of hormonal fluctuations with her thyroid and does not want to change it at this point. Palpitations  She was seen by her cardiologist and had a Holter. She reports everything was normal    Migraines  Stable      Nicolle   Seen dr. Shmuel Ibanez when following with Dr. García Schwab   Was offered plaquenil if sx persisted pt defered at the time      Vitamin D deficiency  Vit d she is currently taking 4000 international units daily.       Review of Systems  Constitutional: negative for fevers, chills, anorexia and weight loss  Eyes:   negative for visual disturbance and irritation  ENT:   negative for tinnitus,sore throat,nasal congestion,ear pains. hoarseness  Respiratory:  negative for cough, hemoptysis, dyspnea,wheezing  CV:   negative for chest pain, palpitations, lower extremity edema  GI:   negative for nausea, vomiting, diarrhea, abdominal pain,melena  Endo:               negative for polyuria,polydipsia,polyphagia,heat intolerance  Genitourinary: negative for frequency, dysuria and hematuria  Integument:  negative for rash and pruritus  Hematologic:  negative for easy bruising and gum/nose bleeding  Musculoskel: negative for myalgias, arthralgias, back pain, muscle weakness, joint pain  Neurological:  negative for headaches, dizziness, vertigo, memory problems and gait   Behavl/Psych: negative for feelings of anxiety, depression, mood changes    Past Medical History:   Diagnosis Date    Headache      migraines aura    Herpes simplex virus (HSV) infection     oral in genital region; no suppression due to scheduled c/s    Thyroid cancer ([facility] [address] 2018    Trauma 2013    broken tail bone, VAGINAL delivery     Past Surgical History:   Procedure Laterality Date    HX GYN  10/2016         HX OTHER SURGICAL      wisdom teeth removed    HX PARTIAL THYROIDECTOMY  2018     Social History     Social History    Marital status:      Spouse name: N/A    Number of children: N/A    Years of education: N/A     Social History Main Topics    Smoking status: Never Smoker    Smokeless tobacco: None    Alcohol use 0.6 - 1.2 oz/week     1 - 2 Cans of beer per week      Comment: occasional    Drug use: No    Sexual activity: Yes     Partners: Male     Birth control/ protection: None     Other Topics Concern    None     Social History Narrative    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    Did [facility] health            2 kids    3.6 yo and 4.5 months         Family History Problem Relation Age of Onset    Headache Mother         htn    Hypertension Mother     Headache Father         heart block pacers    Heart Disease Father     Cancer Maternal Grandmother         breast    Heart Disease Maternal Grandfather     Heart Disease Paternal Grandmother     Heart Disease Paternal Grandfather     Cancer Other         breast     Current Outpatient Medications   Medication Sig Dispense Refill    fluticasone (FLONASE ALLERGY RELIEF) 50 mcg/actuation nasal spray 2 Sprays by Both Nostrils route daily.  loratadine (CLARITIN) 10 mg tablet Take 10 mg by mouth.  levothyroxine (SYNTHROID) 100 mcg tablet Take  by mouth Daily (before breakfast).  citalopram (CELEXA) 20 mg tablet TAKE 1 TABLET BY MOUTH EVERY DAY INDICATIONS: GENERALIZED ANXIETY DISORDER 30 Tab 1    busPIRone (BUSPAR) 10 mg tablet Take 0.5 Tabs by mouth daily as needed. 30 Tab 0    cholecalciferol (VITAMIN D3) 50,000 unit capsule Take 1 Cap by mouth every seven (7) days.  8 Cap 0    nystatin-triamcinolone (MYCOLOG II) topical cream APPLY EXTERNALLY TWICE A DAY FOR 14 DAYS  0    Dapsone 5 % gel APPLY TO FACE DAILY  99     No Known Allergies    Objective:  Visit Vitals  /65 (BP 1 Location: Left arm, BP Patient Position: Sitting)   Pulse 85   Temp 99 °F (37.2 °C) (Oral)   Resp 12   Ht 5' 5\" (1.651 m)   Wt 145 lb (65.8 kg)   LMP 12/19/2017 (Exact Date)   BMI 24.13 kg/m²     Physical Exam:   General appearance - alert, well appearing, and in no distress  Mental status - alert, oriented to person, place, and time  EYE-REG, EOMI, corneas normal, no foreign bodies, visual acuity normal both eyes, no periorbital cellulitis  ENT-ENT exam normal, no neck nodes or sinus tenderness  Nose - normal and patent, no erythema, discharge or polyps  Mouth - mucous membranes moist, pharynx normal without lesions  Neck - supple, no significant adenopathy 5.5 cm healed incision base of neck  Chest - clear to auscultation, no wheezes, rales or rhonchi, symmetric air entry   Heart - normal rate, regular rhythm, normal S1, S2, no murmurs, rubs, clicks or gallops   Abdomen - soft, nontender, nondistended, no masses or organomegaly  Lymph- no adenopathy palpable  Ext-peripheral pulses normal, no pedal edema, no clubbing or cyanosis  Skin-Warm and dry. no hyperpigmentation, vitiligo, or suspicious lesions  Neuro -alert, oriented, normal speech, no focal findings or movement disorder noted  Neck-normal C-spine, no tenderness, full ROM without pain      Results for orders placed or performed in visit on 04/05/18   MARK W/REFLEX   Result Value Ref Range    Antinuclear Antibodies Direct Negative Negative   CBC W/O DIFF   Result Value Ref Range    WBC 6.1 3.4 - 10.8 x10E3/uL    RBC 4.32 3.77 - 5.28 x10E6/uL    HGB 13.0 11.1 - 15.9 g/dL    HCT 37.5 34.0 - 46.6 %    MCV 87 79 - 97 fL    MCH 30.1 26.6 - 33.0 pg    MCHC 34.7 31.5 - 35.7 g/dL    RDW 14.1 12.3 - 15.4 %    PLATELET 533 512 - 568 [id number]   METABOLIC PANEL, COMPREHENSIVE   Result Value Ref Range    Glucose 93 65 - 99 mg/dL    BUN 13 6 - 20 mg/dL    Creatinine 0.58 0.57 - 1.00 mg/dL    GFR est non- >59 mL/min/1.73    GFR est  >59 mL/min/1.73    BUN/Creatinine ratio 22 9 - 23    Sodium 143 134 - 144 mmol/L    Potassium 4.3 3.5 - 5.2 mmol/L    Chloride 105 96 - 106 mmol/L    CO2 24 18 - 29 mmol/L    Calcium 9.5 8.7 - 10.2 mg/dL    Protein, total 7.8 6.0 - 8.5 g/dL    Albumin 4.8 3.5 - 5.5 g/dL    GLOBULIN, TOTAL 3.0 1.5 - 4.5 g/dL    A-G Ratio 1.6 1.2 - 2.2    Bilirubin, total 0.4 0.0 - 1.2 mg/dL    Alk.  phosphatase 78 39 - 117 IU/L    AST (SGOT) 18 0 - 40 IU/L    ALT (SGPT) 19 0 - 32 IU/L   SED RATE (ESR)   Result Value Ref Range    Sed rate (ESR) 4 0 - 32 mm/hr   T4, FREE   Result Value Ref Range    T4, Free 1.24 0.82 - 1.77 ng/dL   TSH 3RD GENERATION   Result Value Ref Range    TSH 3.090 0.450 - 4.500 uIU/mL   VITAMIN D, 25 HYDROXY   Result Value Ref Range    VITAMIN D, 25-HYDROXY 27.3 (L) 30.0 - 100.0 ng/mL     Prevention    Cardiovascular profile  Family hx  Exercising:  Elliptical   Blood pressure:  Health healthy diet:  Diabetes:  Cholesterol:  Renal function:      Cancer risk profile  [facility]  Lung exercise intolerance  Colonoscopy chronic constipation painful docusate  Skin nonhealing in 2 weeks derm in past for acne rosacea  Gyn abnormal bleeding/discharge/abd pain/pressure, Joshua Louisa      Thyroid sx    Osteopenia prevention  Calcium 1000mg/day yes  Vitamin D 800iu/day yes    Mental health scale: 9/10  Depression  Anxiety  Sleep # of hours:  Energy Level:        Immunizations  TDAP 2016  Pneumonia vaccine  Flu vaccine  Shingles vaccine  HPV      Diagnoses and all orders for this visit:    1. Thyroid cancer [facility])  New diagnosis  She will have follow-up with Dr. Rubi Casanova and Dr. Brandy Hess. Continue levothyroxine at 100 mcg for now    2. Other fatigue  Continue on Celexa for now. She is not gaining weight on this medication. -     citalopram (CELEXA) 20 mg tablet; Take 1 tab po daily    3. Vitamin D deficiency  Continue repletion with 4000 international units daily per endocrine. She will likely have her thyroid suppressed eventually so good to have her calcium and vitamin D in normal range.   We will continue to monitor her levels

## 2019-01-16 DIAGNOSIS — R53.83 OTHER FATIGUE: ICD-10-CM

## 2019-01-25 ENCOUNTER — OFFICE VISIT (OUTPATIENT)
Dept: ENDOCRINOLOGY | Age: 34
End: 2019-01-25

## 2019-01-25 VITALS
HEART RATE: 77 BPM | OXYGEN SATURATION: 100 % | BODY MASS INDEX: 24.69 KG/M2 | HEIGHT: 65 IN | WEIGHT: 148.2 LBS | DIASTOLIC BLOOD PRESSURE: 73 MMHG | RESPIRATION RATE: 16 BRPM | SYSTOLIC BLOOD PRESSURE: 116 MMHG

## 2019-01-25 DIAGNOSIS — C73 PAPILLARY THYROID CARCINOMA (HCC): Primary | ICD-10-CM

## 2019-01-25 DIAGNOSIS — E89.0 POSTOPERATIVE HYPOTHYROIDISM: ICD-10-CM

## 2019-01-25 RX ORDER — OXYCODONE HYDROCHLORIDE 5 MG/1
TABLET ORAL
Refills: 0 | COMMUNITY
Start: 2018-12-15 | End: 2020-01-20 | Stop reason: ALTCHOICE

## 2019-01-25 RX ORDER — KETOROLAC TROMETHAMINE 15.75 MG/1
1 SPRAY, METERED NASAL
COMMUNITY
Start: 2018-12-07 | End: 2020-07-22 | Stop reason: ALTCHOICE

## 2019-01-25 NOTE — PROGRESS NOTES
Endocrinology New Patient Visit    Chief Complaint: thyroid cancer    Referring provider: Genoveva Porras MD     History of Present Illness:  Radha Wilburn is an 29 y.o. female with recently diagnosed PTC. This was discovered incidentally on an ultrasound done in Sept. It showed BL thyroid nodules, 1.9cm on the right and 1.4cm on the left. She underwent a biopsy last year which was suspicious for PTC so she was referred to Dr Te Villasenor at [facility]. She underwent total thyroidectomy on 18 and pathology showed a 2.3cm right sided PTC and 1.1cm left sided PTC, multifocal but without extrathyroidal extension, angioinvasion, or lymphatic invasion (see scanned pathology report). Lymph node dissection was not pursued. She is approximately 6 weeks post op now and denies dysphagia, supine compression, voice hoarseness or new masses in the neck. In terms of her post surgical hypothyroidism, she is currently taking generic levothyroxine 100 mcg daily. She takes it correctly on an empty stomach, first thing in the morning, no food for 30-60 minutes, no multivitamins for 2-4 hours. She does report feeling more tired lately, feels mentally slow as well. Also reports dry skin and cold intolerance. Has chronic constipation but this seems to be worsening and is refractory to her bowel regimen of colace and miralax. Weight is up a few pounds. She denies racing heart, tremors, or palpitations.     ROS: see HPI for pertinent positives and negatives, otherwise a 10 system review is negative    Problem list:  Patient Active Problem List   Diagnosis Code    Single delivery by  O82    Migraine with aura and without status migrainosus, not intractable G43.109       Medical history:  Past Medical History:   Diagnosis Date    Headache      migraines aura    Herpes simplex virus (HSV) infection     oral in genital region; no suppression due to scheduled c/s    Thyroid cancer ([facility] [address] 2018    Trauma 2013 broken tail bone, VAGINAL delivery       Past surgical history:  Past Surgical History:   Procedure Laterality Date    HX GYN  10/2016         HX OTHER SURGICAL      wisdom teeth removed    HX PARTIAL THYROIDECTOMY  2018       Medications:    Current Outpatient Medications:     eletriptan hydrobromide (RELPAX PO), Take  by mouth., Disp: , Rfl:     ketorolac 15.75 mg/spray spry, 1 Spray by Nasal route., Disp: , Rfl:     copper (PARAGARD T 380A) 380 square mm IUD, ParaGard T 380A 380 square mm intrauterine device  Take by intrauterine route., Disp: , Rfl:     fluticasone (FLONASE ALLERGY RELIEF) 50 mcg/actuation nasal spray, 2 Sprays by Both Nostrils route daily. , Disp: , Rfl:     loratadine (CLARITIN) 10 mg tablet, Take 10 mg by mouth., Disp: , Rfl:     levothyroxine (SYNTHROID) 100 mcg tablet, Take  by mouth Daily (before breakfast). , Disp: , Rfl:     citalopram (CELEXA) 20 mg tablet, Take 1 tab po daily, Disp: 90 Tab, Rfl: 1    oxyCODONE IR (ROXICODONE) 5 mg immediate release tablet, TAKE 1 TABLET BY MOUTH EVERY 6 HOURS AS NEEDED FOR PAIN, Disp: , Rfl: 0    busPIRone (BUSPAR) 10 mg tablet, Take 0.5 Tabs by mouth daily as needed. , Disp: 30 Tab, Rfl: 0    nystatin-triamcinolone (MYCOLOG II) topical cream, APPLY EXTERNALLY TWICE A DAY FOR 14 DAYS, Disp: , Rfl: 0    cholecalciferol (VITAMIN D3) 50,000 unit capsule, Take 1 Cap by mouth every seven (7) days. , Disp: 8 Cap, Rfl: 0    Dapsone 5 % gel, APPLY TO FACE DAILY, Disp: , Rfl: 99    Allergies:  No Known Allergies    Social History:  Social History     Socioeconomic History    Marital status:      Spouse name: Not on file    Number of children: Not on file    Years of education: Not on file    Highest education level: Not on file   Social Needs    Financial resource strain: Not on file    Food insecurity - worry: Not on file    Food insecurity - inability: Not on file   [employer] needs - medical: Not on file   Ermelinda Valdez Transportation needs - non-medical: Not on file   Occupational History    Not on file   Tobacco Use    Smoking status: Never Smoker    Smokeless tobacco: Never Used   Substance and Sexual Activity    Alcohol use: No     Alcohol/week: 0.6 - 1.2 oz     Types: 1 - 2 Cans of beer per week     Comment: occasional    Drug use: No    Sexual activity: Yes     Partners: Male     Birth control/protection: IUD   Other Topics Concern    Not on file   Social History Narrative    Part time  [address] [facility] [employer] [facility] in for orthopedic pts     is NP, 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 but daugther with allergy to food        Did [facility] [employer]            2 kids    3 yo and 14 months       Family History:  Family History   Problem Relation Age of Onset    Headache Mother         htn    Hypertension Mother     Headache Father         heart block pacers    Heart Disease Father     Cancer Maternal Grandmother         breast    Heart Disease Maternal Grandfather     Heart Disease Paternal Grandmother     Heart Disease Paternal Grandfather     Cancer Other         breast       Physical Exam:  Visit Vitals  /73   Pulse 77   Resp 16   Ht 5' 5\" (1.651 m)   Wt 148 lb 3.2 oz (67.2 kg)   SpO2 100%   BMI 24.66 kg/m²     Gen: NAD  Heent: normocephalic, atraumatic, mucous membranes moist, no stare. Well healed surgical incision  Thyroid: surgically absent, no masses appreciated  Heme/lymph: no cervical, supraclavicular or submandibular lymphadenopathy is appreciated. Pulmonary: clear to auscultation bilaterally, no wheezes/rhonchi or rales  Cardiovascular: regular rate and rhythm, no murmurs, rubs or gallops, good distal pulses  Extremities: no clubbing, cyanosis or edema. No onocholysis   Neuro: no tremor of the outstretch hands, reflexes are normal with normal relaxation phase.  Normal gait, normal concentration  Skin: normal texture, slightly dry  Psyche: normal affect with good insight into her medical conditions    Pertinent lab review:  Lab Results   Component Value Date    TSH 3.090 04/05/2018     Ultrasound Sept 2018  FINDINGS:  The thyroid contains a 13 x 15 x 19 mm heterogeneously echogenic right lobe  nodule and 8 x 12 x 14 mm hypoechoic left lobe nodule and otherwise is  homogeneous in echotexture with no mass, other nodule or other abnormality.     The right lobe measures 1.4 x 1.7 x 5.9 cm and the left lobe measures 1.2 x 1.3  x 4.4 cm. The isthmus measures 1.3 mm. Normal appearing lymph nodes are noted  superior to the thyroid glands in the neck.     IMPRESSION: 19 mm right and 14 mm left thyroid nodules. These would be amenable  to ultrasound-guided fine-needle aspiration biopsy. Normal lymph nodes in the  neck superior to the level of the thyroid gland. Assessment and Plan:  Juanita Muse is a pleasant 29 y.o. female here for new patient consultation for h/o papillary thyroid cancer and post surgical hypothyroidism. 1) Papillary thyroid cancer: We reviewed her pathology report in detail today. This is consistent with low-intermediate risk PTC (only intermediate risk feature is multifocality, there is no angio or lymphatic invasion). We discussed indications for radioactive iodine, but I do not feel this is indicated based on her pathology report and the most recent 2015 ALONDRA thyroid cancer guidelines. Plan to follow-up TG result and make a decision together. In the event that we decide to proceed with I-131, we reviewed the preparation for this procedure which includes low iodine diet for 3 weeks and TSH stimulation with Thyrogen. We also discussed the need not to conceive for 12 months post I-131 therapy. She is using a reliable form of contraception for now. Will order a thyroglobulin today (with thyroglobulin antibody) for baseline postsurgically. 2) Post surgical hypothyroidism: Goal TSH is 0.1 to 0.5.  We discussed the role of TSH suppression to reduce recurrence of thyroid cancer. We reviewed how to correctly take her LT4 replacement therapy. Will obtain TSH and FT4 today to further assess thyroid hormone economy. If I adjust her dose, she understands she will need to do f/u labs in 2-3 months. I spent 45 minutes with the patient today and > 50% of the time was spent counseling the patient about papillary thyroid cancer and post-op treatment. Patient will return to clinic in 3 months time. Thank you for the opportunity to partake in this patients care.   Clinton Pacheco MD

## 2019-01-25 NOTE — PATIENT INSTRUCTIONS
Low Iodine Diet      The low iodine diet is given to prepare a patient for thyroid cancer treatment with radioactive iodine (I-131). Patients are typically told to start diet 2 to 4 weeks prior to iodine treatment and to resume a normal diet 2 to 3 days after I-131 treatment. The patient may be asked to collect a 24-hour urine for iodine to determine if he or she has depleted the body of iodine. A patient who has received radiocontrast dye for a CT scan, heart or other vessel catheterization or enteral tube feeds are often iodine saturated. It can take 3 months on a low iodine diet to become depleted. Please remind patients that they can not have these procedures prior to I-131 therapy unless it is an emergency that trumps treatment for thyroid cancer (certainly this can happen as in the case of a person with active chest pain or a more aggressive cancer than thyroid). MRI contrast is safe. Certain pills have red dye including multivitamins, laxatives and over the counter pain medications. Patients may visit the Thyca. org site for more information and recipes.     Foods To Avoid while off Thyroid Hormone   Iodized salt or sea salt, including salty snacks   Milk or other dairy products (small amounts in prepared foods are allowed)   Seafood, especially shellfish, kelp, or seaweed,   Sushi   Any item with carrageen, agar, algin, or alginates   Cured foods such as ham, branch, corned beef, sauerkraut   Breads made with iodated dough conditioners   Food and medicine made with red food dye    Multi-vitamins   Chocolate   Molasses   Soy products   Restaurants foods   Asian foods   Pizza   [place]    Foods Allowed   Fresh Meat   Poultry   Potatoes   Rice   Homemade bread   Fresh or frozen vegetables (not canned)   Fresh or frozen fruit (not canned)    ====================================    Timeline if we do MACHADO:  Low iodine diet x 3 weeks  Thyrogen injections Mon and Tues  I-131 on Wed or Thurs  Post-treatment scan 7d later    ====================================    Goal TSH 0.1 to 0.5 for the first 3-5 years

## 2019-01-29 RX ORDER — LEVOTHYROXINE SODIUM 125 UG/1
125 TABLET ORAL
Qty: 30 TAB | Refills: 2 | Status: SHIPPED | OUTPATIENT
Start: 2019-01-29

## 2019-02-06 ENCOUNTER — TELEPHONE (OUTPATIENT)
Dept: ENDOCRINOLOGY | Age: 34
End: 2019-02-06

## 2019-02-06 DIAGNOSIS — C73 PAPILLARY THYROID CARCINOMA (HCC): Primary | ICD-10-CM

## 2019-02-06 NOTE — TELEPHONE ENCOUNTER
Montserrat James, [facility]? It has been over 2 weeks since it was sent and it still says \"preliminary\" and we typically get these results much quicker. Thanks!

## 2019-02-07 NOTE — TELEPHONE ENCOUNTER
Results were faxed - TG id detectable at 3.5 with negative antibodies. Called pt with results and recommended that we proceed with I-131 remnant ablation given this result. She is agreeable with this plan so I will submit Thyrogen One paperwork and nuclear medicine orders today.      We outlined a tentative timeline:  Feb 11 - start low iodine diet  March 4 & 5 - Thyrogen injections  March 6 or 7th - MACHADO w ~50 mCi I-131  March 13 or 14th - post-treatment scan

## 2019-02-08 LAB
T4 FREE SERPL-MCNC: 1.31 NG/DL (ref 0.82–1.77)
THYROGLOB AB SERPL-ACNC: <1 IU/ML
THYROGLOB SERPL-MCNC: 3.5 NG/ML
THYROGLOB SERPL-MCNC: NORMAL NG/ML
TSH SERPL DL<=0.005 MIU/L-ACNC: 4.27 UIU/ML (ref 0.45–4.5)

## 2019-03-05 ENCOUNTER — TELEPHONE (OUTPATIENT)
Dept: ENDOCRINOLOGY | Age: 34
End: 2019-03-05

## 2019-03-05 NOTE — TELEPHONE ENCOUNTER
Jan Rivers, can you check on the status of the Thyrogen One paperwork we sent over for her? We had planned for her to receive the injections this week but I have not heard anything about where she will receive them. Thank you!

## 2019-03-05 NOTE — TELEPHONE ENCOUNTER
Spoke with Aquilino Hogan at [facility] and was made aware that [facility] has tried over 8 times to reach Lorena Reardon and has not yet heard back from her. I have called and left a message on pt's home and work cell number and as well as left a message for a return phone call with pt's . The pt is currently the reason of delay in receiving the Thyrogen injection.

## 2019-03-05 NOTE — TELEPHONE ENCOUNTER
Spoke with pt and she stated that she no longer needs the thyrogen injection. She also stated that she will now be being seen by the endocrinologist at [facility].

## 2019-03-05 NOTE — TELEPHONE ENCOUNTER
----- Message from [facility] sent at 3/5/2019  3:41 PM EST -----  Regarding: Dr. Godwin Fent: [phone]  Pt is returning a call from Dr. Joel Cortez.

## 2019-09-26 DIAGNOSIS — R53.83 OTHER FATIGUE: ICD-10-CM

## 2019-09-26 RX ORDER — CITALOPRAM 20 MG/1
TABLET, FILM COATED ORAL
Qty: 90 TAB | Refills: 0 | Status: SHIPPED | OUTPATIENT
Start: 2019-09-26 | End: 2019-12-13 | Stop reason: SDUPTHER

## 2020-01-20 ENCOUNTER — OFFICE VISIT (OUTPATIENT)
Dept: INTERNAL MEDICINE CLINIC | Age: 35
End: 2020-01-20

## 2020-01-20 VITALS
HEIGHT: 65 IN | RESPIRATION RATE: 12 BRPM | SYSTOLIC BLOOD PRESSURE: 111 MMHG | OXYGEN SATURATION: 99 % | DIASTOLIC BLOOD PRESSURE: 75 MMHG | HEART RATE: 72 BPM | TEMPERATURE: 98.6 F | WEIGHT: 138.2 LBS | BODY MASS INDEX: 23.03 KG/M2

## 2020-01-20 DIAGNOSIS — F43.9 SITUATIONAL STRESS: Primary | ICD-10-CM

## 2020-01-20 DIAGNOSIS — Z85.850 HISTORY OF THYROID CANCER: ICD-10-CM

## 2020-01-20 RX ORDER — BUSPIRONE HYDROCHLORIDE 5 MG/1
5 TABLET ORAL
Qty: 30 TAB | Refills: 0 | Status: SHIPPED | OUTPATIENT
Start: 2020-01-20 | End: 2020-07-22 | Stop reason: ALTCHOICE

## 2020-01-20 RX ORDER — CITALOPRAM 10 MG/1
TABLET ORAL
Qty: 90 TAB | Refills: 0 | Status: SHIPPED | OUTPATIENT
Start: 2020-01-20 | End: 2020-04-09 | Stop reason: ALTCHOICE

## 2020-01-20 NOTE — PROGRESS NOTES
Jennie Chang is a 36 y.o. female and presents with Medication Refill  . Subjective:    Patient presents for follow-up from her last visit. She is here to discuss her medication Celexa. Patient reports that since her last visit she has been  from her  [place]. She will be officially  in the spring. SUBJECTIVE: Jennie Chang is a 36 y.o. female here for follow up of thyroid cancer. Patient reports she had thyroidectomy by Dr. Jer Stone at [facility]. She is also seeing  Dr. Duyen Roca chair of endocrinology. Her next appointment with them will be Feb or March of 2019. Goal TSH is suppressed for the first 5 years  Her thyroid immunoglobulin was slightly elevated but may need another round of radioactive iodine. she will see Dr. Duyen Roca next Friday. Surveillance twice year along with dr. Kennedi Lee    Lab Results   Component Value Date/Time    TSH 4.270 01/25/2019 02:39 PM     Thyroid ROS: fatigue, weight gain and constipation. Patient is currently on levothyroxine 125 mcg.  8/10   Not seeing a therapist  Legally  next couple of months, things are even out        Situational stress  Feels like her fatigue is getting better  Anxiety wise doing pretty well  Patient reports her fatigue is actually a little bit better. She is well rested when she wakes up. She drinks coffee 2 cups/day but this is her baseline. She is still Sleeping 7-8 hours, now sleeping at least 8 hours, more well rested, doing 1 cup of black tea and soda. He is interested in weaning off her Celexa. Palpitations  No more symptoms    Migraines  Stable      Nicolle   Seen dr. Gladys Louis when following with Dr. Sindhu Argueta   Was offered plaquenil if sx persisted pt defered at the time      Vitamin D deficiency  Vit d she is currently taking 4000 international units daily.       Review of Systems  Constitutional: negative for fevers, chills, anorexia and weight loss  Eyes:   negative for visual disturbance and irritation  ENT:   negative for tinnitus,sore throat,nasal congestion,ear pains. hoarseness  Respiratory:  negative for cough, hemoptysis, dyspnea,wheezing  CV:   negative for chest pain, palpitations, lower extremity edema  GI:   negative for nausea, vomiting, diarrhea, abdominal pain,melena  Endo:               negative for polyuria,polydipsia,polyphagia,heat intolerance  Genitourinary: negative for frequency, dysuria and hematuria  Integument:  negative for rash and pruritus  Hematologic:  negative for easy bruising and gum/nose bleeding  Musculoskel: negative for myalgias, arthralgias, back pain, muscle weakness, joint pain  Neurological:  negative for headaches, dizziness, vertigo, memory problems and gait   Behavl/Psych: negative for feelings of anxiety, depression, mood changes    Past Medical History:   Diagnosis Date    Headache      migraines aura    Herpes simplex virus (HSV) infection     oral in genital region; no suppression due to scheduled c/s    Papillary thyroid carcinoma ([facility] [address] 2019    Thyroid cancer ([facility] [address] 2018    Trauma 2013    broken tail bone, VAGINAL delivery     Past Surgical History:   Procedure Laterality Date    HX GYN  10/2016         HX OTHER SURGICAL      wisdom teeth removed    HX PARTIAL THYROIDECTOMY  2018     Social History     Social History    Marital status:      Spouse name: N/A    Number of children: N/A    Years of education: N/A     Social History Main Topics    Smoking status: Never Smoker    Smokeless tobacco: None    Alcohol use 0.6 - 1.2 oz/week     1 - 2 Cans of beer per week      Comment: occasional    Drug use: No    Sexual activity: Yes     Partners: Male     Birth control/ protection: None     Other Topics Concern    None     Social History Narrative    Part time  [address] [place] [address] 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    Did [employer] 2 kids    3.6 yo and 4.5 months         Family History   Problem Relation Age of Onset    Headache Mother         htn    Hypertension Mother     Headache Father         heart block pacers    Heart Disease Father     Cancer Maternal Grandmother         breast    Heart Disease Maternal Grandfather     Heart Disease Paternal Grandmother     Heart Disease Paternal Grandfather     Cancer Other         breast     Current Outpatient Medications   Medication Sig Dispense Refill    citalopram (CELEXA) 20 mg tablet TAKE 1 TABLET BY MOUTH EVERY DAY 90 Tab 0    levothyroxine (SYNTHROID) 125 mcg tablet Take 1 Tab by mouth Daily (before breakfast). 30 Tab 2    eletriptan hydrobromide (RELPAX PO) Take  by mouth.  ketorolac 15.75 mg/spray spry 1 Spray by Nasal route.  copper (PARAGARD T 380A) 380 square mm IUD ParaGard T 380A 380 square mm intrauterine device   Take by intrauterine route.  fluticasone (FLONASE ALLERGY RELIEF) 50 mcg/actuation nasal spray 2 Sprays by Both Nostrils route daily.  loratadine (CLARITIN) 10 mg tablet Take 10 mg by mouth.  busPIRone (BUSPAR) 10 mg tablet Take 0.5 Tabs by mouth daily as needed. (Patient taking differently: Take 5 mg by mouth daily as needed. Indications: not taking) 30 Tab 0    cholecalciferol (VITAMIN D3) 50,000 unit capsule Take 1 Cap by mouth every seven (7) days. (Patient taking differently: Take 50,000 Units by mouth every seven (7) days.  Indications: not taking) 8 Cap 0    Dapsone 5 % gel Indications: not taking  99     No Known Allergies    Objective:  Visit Vitals  /75 (BP 1 Location: Left arm, BP Patient Position: Sitting)   Pulse 72   Temp 98.6 °F (37 °C) (Oral)   Resp 12   Ht 5' 5\" (1.651 m)   Wt 138 lb 3.2 oz (62.7 kg)   LMP 01/16/2020 (Exact Date)   SpO2 99%   BMI 23.00 kg/m²     Physical Exam:   General appearance - alert, well appearing, and in no distress  Mental status - alert, oriented to person, place, and time  EYE-REG, EOMI, corneas normal, no foreign bodies, visual acuity normal both eyes, no periorbital cellulitis  ENT-ENT exam normal, no neck nodes or sinus tenderness  Nose - normal and patent, no erythema, discharge or polyps  Mouth - mucous membranes moist, pharynx normal without lesions  Neck - supple, no significant adenopathy 5.5 cm healed incision base of neck  Chest - clear to auscultation, no wheezes, rales or rhonchi, symmetric air entry   Heart - normal rate, regular rhythm, normal S1, S2, no murmurs, rubs, clicks or gallops   Abdomen - soft, nontender, nondistended, no masses or organomegaly  Lymph- no adenopathy palpable  Ext-peripheral pulses normal, no pedal edema, no clubbing or cyanosis  Skin-Warm and dry.  no hyperpigmentation, vitiligo, or suspicious lesions  Neuro -alert, oriented, normal speech, no focal findings or movement disorder noted  Neck-normal C-spine, no tenderness, full ROM without pain      Results for orders placed or performed in visit on 01/25/19   THYROGLOBULIN,COMPREHENSIVE   Result Value Ref Range    Anti-Thyroglobulin Abs <1.0 IU/mL    Thyroglobulin (ICMA) 3.5 ng/mL    Thyroglobulin (TG-BAN) Comment ng/mL   TSH AND FREE T4   Result Value Ref Range    TSH 4.270 0.450 - 4.500 uIU/mL    T4, Free 1.31 0.82 - 1.77 ng/dL     Prevention    Cardiovascular profile  Family hx  Exercising:  Elliptical   Blood pressure:  Health healthy diet:  Diabetes:  Cholesterol:  Renal function:      Cancer risk profile  [facility]amm[facility]  Lung exercise intolerance  Colonoscopy chronic constipation painful docusate  Skin nonhealing in 2 weeks derm in past for acne rosacea  Gyn abnormal bleeding/discharge/abd pain/pressure, [place]      Thyroid sx    Osteopenia prevention  Calcium 1000mg/day yes  Vitamin D 800iu/day yes    Mental health scale: 9/10  Depression  Anxiety  Sleep # of hours:  Energy Level:        Immunizations  TDAP 2016  Pneumonia vaccine  Flu vaccine  Shingles vaccine  HPV      Diagnoses and all orders for this visit:    1. Situational stress    Patient's anxiety is significantly more improved than prior visits. Her energy level is better. She would like to wean off 20 mg of Celexa. We discussed and she will go to 10 mg for 1 to 2 weeks and then 5 mg to off. She will let me know if she is having any problems with this. She has been on for a year but still may be at risk for discontinuation syndrome. She can take BuSpar if needed for situational stressors  Discussed with patient weaning down of her Celexa and to monitor for discontinuation symptoms. -     citalopram (CELEXA) 10 mg tablet; Take 1 po daily and wean as directed  -     busPIRone (BUSPAR) 5 mg tablet; Take 1 Tab by mouth daily as needed (situational stress). Indications: not taking    2. History of thyroid cancer  Stable  Patient will follow-up with endocrinology-  Continue levothyroxine for now. She will have additional labs with endocrinology.       Patient will follow-up in the spring for her annual.

## 2020-04-09 DIAGNOSIS — R53.83 OTHER FATIGUE: ICD-10-CM

## 2020-04-09 RX ORDER — CITALOPRAM 20 MG/1
TABLET, FILM COATED ORAL
Qty: 90 TAB | Refills: 0 | Status: SHIPPED | OUTPATIENT
Start: 2020-04-09 | End: 2020-04-14 | Stop reason: ALTCHOICE

## 2020-04-14 RX ORDER — CITALOPRAM 10 MG/1
TABLET ORAL
Qty: 90 TAB | Refills: 0 | Status: SHIPPED | OUTPATIENT
Start: 2020-04-14 | End: 2020-07-22 | Stop reason: ALTCHOICE

## 2020-07-22 ENCOUNTER — HOSPITAL ENCOUNTER (OUTPATIENT)
Dept: LAB | Age: 35
Discharge: HOME | End: 2020-07-22

## 2020-07-22 ENCOUNTER — OFFICE VISIT (OUTPATIENT)
Dept: INTERNAL MEDICINE CLINIC | Age: 35
End: 2020-07-22

## 2020-07-22 VITALS
SYSTOLIC BLOOD PRESSURE: 106 MMHG | HEART RATE: 76 BPM | HEIGHT: 65 IN | BODY MASS INDEX: 22.26 KG/M2 | RESPIRATION RATE: 14 BRPM | OXYGEN SATURATION: 98 % | TEMPERATURE: 98.2 F | DIASTOLIC BLOOD PRESSURE: 73 MMHG | WEIGHT: 133.6 LBS

## 2020-07-22 DIAGNOSIS — E53.8 B12 DEFICIENCY DUE TO DIET: ICD-10-CM

## 2020-07-22 DIAGNOSIS — Z00.00 WELL ADULT EXAM: ICD-10-CM

## 2020-07-22 DIAGNOSIS — N92.0 MENORRHAGIA WITH REGULAR CYCLE: ICD-10-CM

## 2020-07-22 DIAGNOSIS — M54.2 NECK PAIN: ICD-10-CM

## 2020-07-22 DIAGNOSIS — Z85.850 HISTORY OF THYROID CANCER: ICD-10-CM

## 2020-07-22 DIAGNOSIS — Z00.00 WELL ADULT EXAM: Primary | ICD-10-CM

## 2020-07-22 LAB
ALBUMIN SERPL-MCNC: 4.6 G/DL (ref 3.5–5)
ALBUMIN/GLOB SERPL: 1.4 {RATIO} (ref 1.1–2.2)
ALP SERPL-CCNC: 73 U/L (ref 45–117)
ALT SERPL-CCNC: 24 U/L (ref 12–78)
ANION GAP SERPL CALC-SCNC: 9 MMOL/L (ref 5–15)
AST SERPL-CCNC: 21 U/L (ref 15–37)
BILIRUB SERPL-MCNC: 0.6 MG/DL (ref 0.2–1)
BUN SERPL-MCNC: 9 MG/DL (ref 6–20)
BUN/CREAT SERPL: 16 (ref 12–20)
CALCIUM SERPL-MCNC: 9.1 MG/DL (ref 8.5–10.1)
CHLORIDE SERPL-SCNC: 106 MMOL/L (ref 97–108)
CHOLEST SERPL-MCNC: 220 MG/DL
CO2 SERPL-SCNC: 22 MMOL/L (ref 21–32)
CREAT SERPL-MCNC: 0.55 MG/DL (ref 0.55–1.02)
ERYTHROCYTE [DISTWIDTH] IN BLOOD BY AUTOMATED COUNT: 12.8 % (ref 11.5–14.5)
FERRITIN SERPL-MCNC: 37 NG/ML (ref 8–252)
FOLATE SERPL-MCNC: 21.1 NG/ML (ref 5–21)
GLOBULIN SER CALC-MCNC: 3.3 G/DL (ref 2–4)
GLUCOSE SERPL-MCNC: 83 MG/DL (ref 65–100)
HCT VFR BLD AUTO: 38 % (ref 35–47)
HDLC SERPL-MCNC: 89 MG/DL
HDLC SERPL: 2.5 {RATIO} (ref 0–5)
HGB BLD-MCNC: 12.4 G/DL (ref 11.5–16)
IRON SATN MFR SERPL: 41 % (ref 20–50)
IRON SERPL-MCNC: 163 UG/DL (ref 35–150)
LDLC SERPL CALC-MCNC: 110.4 MG/DL (ref 0–100)
LIPID PROFILE,FLP: ABNORMAL
MCH RBC QN AUTO: 30.7 PG (ref 26–34)
MCHC RBC AUTO-ENTMCNC: 32.6 G/DL (ref 30–36.5)
MCV RBC AUTO: 94.1 FL (ref 80–99)
NRBC # BLD: 0 K/UL (ref 0–0.01)
NRBC BLD-RTO: 0 PER 100 WBC
PLATELET # BLD AUTO: 173 K/UL (ref 150–400)
PMV BLD AUTO: 10.7 FL (ref 8.9–12.9)
POTASSIUM SERPL-SCNC: 3.8 MMOL/L (ref 3.5–5.1)
PROT SERPL-MCNC: 7.9 G/DL (ref 6.4–8.2)
RBC # BLD AUTO: 4.04 M/UL (ref 3.8–5.2)
SODIUM SERPL-SCNC: 137 MMOL/L (ref 136–145)
TIBC SERPL-MCNC: 401 UG/DL (ref 250–450)
TRIGL SERPL-MCNC: 103 MG/DL (ref ?–150)
VIT B12 SERPL-MCNC: 471 PG/ML (ref 193–986)
VLDLC SERPL CALC-MCNC: 20.6 MG/DL
WBC # BLD AUTO: 6.5 K/UL (ref 3.6–11)

## 2020-07-22 RX ORDER — MINERAL OIL
ENEMA (ML) RECTAL
COMMUNITY
End: 2022-10-25

## 2020-07-22 NOTE — PROGRESS NOTES
George Dye is a 30 y.o. female and presents with Complete Physical  .    Subjective:  Patient presents for heartbeat well visit and physical.    She reports she has been doing well. She has been working through the coronavirus precautions. She is the NP at orthopedics inpatient. She did not have any days off. Intentional weight loss  Cutting back on carbs  Energy level has been good  Sleeping 7 hours, well rested  Exercising walking and elliptical stairmaster       SUBJECTIVE: George Dye is a 30 y.o. female here for follow up of thyroid cancer. Patient reports she had thyroidectomy by Dr. Lashonda Chery at [facility]. She is also seeing  Dr. Amy Valdivia chair of endocrinology. She recently had a virtual visit with Dr. Devin Favre. She is having persistent symptoms of fullness at her left neck. She was not able to have an exam due to virtual visit. She is also seeing Dr. Aym Valdivia and TSH is suppressed for the first 5 years. She is not having any thyroid issues but she is having persistent fullness in the left neck. Lab Results   Component Value Date/Time    TSH 4.270 01/25/2019 02:39 PM     Thyroid ROS: fatigue, weight gain and constipation. Patient is currently on levothyroxine 125 mcg. [address] 8/10   Not seeing a therapist  She is successfully weaned off Celexa. She never used BuSpar. Palpitations  No more symptoms    Migraines  Stable    Vitamin D deficiency  She is not taking vitamin D      Menorrhagia  Patient is having 12-day manses. She is on copper IUD. She will keep this in until 2028. Review of Systems  Constitutional: negative for fevers, chills, anorexia and weight loss  Eyes:   negative for visual disturbance and irritation  ENT:   negative for tinnitus,sore throat,nasal congestion,ear pains. hoarseness  Respiratory:  negative for cough, hemoptysis, dyspnea,wheezing  CV:   negative for chest pain, palpitations, lower extremity edema  GI:   negative for nausea, vomiting, diarrhea, abdominal pain,melena  Endo:               negative for polyuria,polydipsia,polyphagia,heat intolerance  Genitourinary: negative for frequency, dysuria and hematuria  Integument:  negative for rash and pruritus  Hematologic:  negative for easy bruising and gum/nose bleeding  Musculoskel: negative for myalgias, arthralgias, back pain, muscle weakness, joint pain  Neurological:  negative for headaches, dizziness, vertigo, memory problems and gait   Behavl/Psych: negative for feelings of anxiety, depression, mood changes    Past Medical History:   Diagnosis Date    Headache      migraines aura    Herpes simplex virus (HSV) infection     oral in genital region; no suppression due to scheduled c/s    Papillary thyroid carcinoma ([facility] [address] 2019    dr. Eddie Kay and Dr. Anisa Kam, [facility]    Thyroid cancer [facility]) 2018    Trauma 2013    broken tail bone, VAGINAL delivery     Past Surgical History:   Procedure Laterality Date    HX GYN  10/2016         HX OTHER SURGICAL      wisdom teeth removed    HX PARTIAL THYROIDECTOMY  2018     Social History     Social History    Marital status:      Spouse name: N/A    Number of children: N/A    Years of education: N/A     Social History Main Topics    Smoking status: Never Smoker    Smokeless tobacco: None    Alcohol use 0.6 - 1.2 oz/week     1 - 2 Cans of beer per week      Comment: occasional    Drug use: No    Sexual activity: Yes     Partners: Male     Birth control/ protection: None     Other Topics Concern    None     Social History Narrative    Part time  [employer] in for orthopedic pts     is NP, [place]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 Nghia Byrd    Did [facility] health            2 kids    3.4 yo and 4.5 months         Family History   Problem Relation Age of Onset    Headache Mother         htn    Hypertension Mother     Headache Father         heart block pacers    Heart Disease Father     Cancer Maternal Grandmother         breast    Heart Disease Maternal Grandfather     Heart Disease Paternal Grandmother     Heart Disease Paternal Grandfather     Cancer Other         breast     Current Outpatient Medications   Medication Sig Dispense Refill    fexofenadine (ALLEGRA) 180 mg tablet Take  by mouth.  levothyroxine (SYNTHROID) 125 mcg tablet Take 1 Tab by mouth Daily (before breakfast). 30 Tab 2    eletriptan hydrobromide (RELPAX PO) Take  by mouth.  copper (PARAGARD T 380A) 380 square mm IUD ParaGard T 380A 380 square mm intrauterine device   Take by intrauterine route.  fluticasone (FLONASE ALLERGY RELIEF) 50 mcg/actuation nasal spray 2 Sprays by Both Nostrils route daily. No Known Allergies    Objective:  Visit Vitals  /73 (BP 1 Location: Left arm, BP Patient Position: Sitting)   Pulse 76   Temp 98.2 °F (36.8 °C) (Oral)   Resp 14   Ht 5' 5\" (1.651 m)   Wt 133 lb 9.6 oz (60.6 kg)   SpO2 98%   BMI 22.23 kg/m²     Physical Exam:   General appearance - alert, well appearing, and in no distress  Mental status - alert, oriented to person, place, and time  EYE-REG, EOMI, corneas normal, no foreign bodies, visual acuity normal both eyes, no periorbital cellulitis  ENT-ENT exam normal, no neck nodes or sinus tenderness  Nose - normal and patent, no erythema, discharge or polyps  Mouth - mucous membranes moist, pharynx normal without lesions  Neck - supple, no significant adenopathy 5.5 cm healed incision base of neck  Chest - clear to auscultation, no wheezes, rales or rhonchi, symmetric air entry   Heart - normal rate, regular rhythm, normal S1, S2, no murmurs, rubs, clicks or gallops   Abdomen - soft, nontender, nondistended, no masses or organomegaly  Lymph- no adenopathy palpable  Ext-peripheral pulses normal, no pedal edema, no clubbing or cyanosis  Skin-Warm and dry.  no hyperpigmentation, vitiligo, or suspicious lesions  Neuro -alert, oriented, normal speech, no focal findings or movement disorder noted  Neck-normal C-spine, no tenderness, full ROM without pain      Results for orders placed or performed in visit on 01/25/19   THYROGLOBULIN,COMPREHENSIVE   Result Value Ref Range    Anti-Thyroglobulin Abs <1.0 IU/mL    Thyroglobulin (ICMA) 3.5 ng/mL    Thyroglobulin (TG-BAN) Comment ng/mL   TSH AND FREE T4   Result Value Ref Range    TSH 4.270 0.450 - 4.500 uIU/mL    T4, Free 1.31 0.82 - 1.77 ng/dL     Prevention    Cardiovascular profile  Family hx  Exercising:  Elliptical   Blood pressure:  Health healthy diet:  Diabetes:  Cholesterol:  Renal function:      Cancer risk profile  Mamm[facility]  Lung exercise intolerance  Colonoscopy chronic constipation painful docusate  Skin nonhealing in 2 weeks derm in past for acne rosacea  Gyn abnormal bleeding/discharge/abd pain/pressure, Ladell Sarks      Thyroid sx    Osteopenia prevention  Calcium 1000mg/day yes  Vitamin D 800iu/day yes    Mental health scale: 9/10  Depression  Anxiety  Sleep # of hours:  Energy Level:        Immunizations  TDAP 2016  Pneumonia vaccine  Flu vaccine  Shingles vaccine  HPV    Diagnoses and all orders for this visit:    1. Well adult exam  Patient appears in overall good health.  -     METABOLIC PANEL, COMPREHENSIVE; Future  -     LIPID PANEL; Future    2. Menorrhagia with regular cycle  Patient does eat red meat  Persistent periods will check for iron deficiency  -     CBC W/O DIFF; Future  -     IRON PROFILE; Future  -     FERRITIN; Future    Follow-up with Dr. Guadalupe Castaneda    3. B12 deficiency due to diet  Does not eat red meat  -     VITAMIN B12 & FOLATE; Future    4. Neck pain  Patient has neck fullness on exam and left cervical lymphadenopathy  Will CT  -     CT NECK SOFT TISSUE W CONT; Future    5.  History of thyroid cancer  Will do baseline CT  She has a follow-up with her endocrinologist in early August  -     CT NECK SOFT TISSUE W CONT; Future    Aside from patient's well visit I spent an additional 15 minutes with this patient greater than 50% of the time was spent in management and counseling with regards to her left neck pain and fullness, menorrhagia

## 2020-08-03 ENCOUNTER — HOSPITAL ENCOUNTER (OUTPATIENT)
Dept: CT IMAGING | Age: 35
Discharge: HOME | End: 2020-08-03
Attending: INTERNAL MEDICINE
Payer: COMMERCIAL

## 2020-08-03 DIAGNOSIS — M54.2 NECK PAIN: ICD-10-CM

## 2020-08-03 DIAGNOSIS — Z85.850 HISTORY OF THYROID CANCER: ICD-10-CM

## 2020-08-03 PROCEDURE — 70491 CT SOFT TISSUE NECK W/DYE: CPT

## 2020-08-03 PROCEDURE — 74011000258 HC RX REV CODE- 258: Performed by: INTERNAL MEDICINE

## 2020-08-03 PROCEDURE — 74011636320 HC RX REV CODE- 636/320: Performed by: INTERNAL MEDICINE

## 2020-08-03 RX ORDER — SODIUM CHLORIDE 0.9 % (FLUSH) 0.9 %
10 SYRINGE (ML) INJECTION
Status: COMPLETED | OUTPATIENT
Start: 2020-08-03 | End: 2020-08-03

## 2020-08-03 RX ADMIN — IOPAMIDOL 100 ML: 612 INJECTION, SOLUTION INTRAVENOUS at 10:16

## 2020-08-03 RX ADMIN — SODIUM CHLORIDE 50 ML: 900 INJECTION, SOLUTION INTRAVENOUS at 10:16

## 2020-08-03 RX ADMIN — Medication 10 ML: at 10:16

## 2022-02-22 ENCOUNTER — VIRTUAL VISIT (OUTPATIENT)
Dept: INTERNAL MEDICINE CLINIC | Age: 37
End: 2022-02-22
Payer: COMMERCIAL

## 2022-02-22 DIAGNOSIS — J45.20 MILD INTERMITTENT REACTIVE AIRWAY DISEASE WITHOUT COMPLICATION: Primary | ICD-10-CM

## 2022-02-22 PROCEDURE — 99213 OFFICE O/P EST LOW 20 MIN: CPT | Performed by: INTERNAL MEDICINE

## 2022-02-22 RX ORDER — ALBUTEROL SULFATE 90 UG/1
1 AEROSOL, METERED RESPIRATORY (INHALATION)
Qty: 1 EACH | Refills: 1 | Status: SHIPPED | OUTPATIENT
Start: 2022-02-22 | End: 2022-10-25

## 2022-02-22 RX ORDER — LORATADINE 10 MG/1
10 TABLET ORAL
COMMUNITY
End: 2022-10-25

## 2022-02-22 RX ORDER — ALBUTEROL SULFATE 0.83 MG/ML
2.5 SOLUTION RESPIRATORY (INHALATION)
Qty: 60 NEBULE | Refills: 5 | Status: SHIPPED | OUTPATIENT
Start: 2022-02-22 | End: 2022-10-25

## 2022-02-22 RX ORDER — MONTELUKAST SODIUM 10 MG/1
10 TABLET ORAL DAILY
Qty: 30 TABLET | Refills: 1 | Status: SHIPPED | OUTPATIENT
Start: 2022-02-22 | End: 2022-10-25

## 2022-02-22 NOTE — PROGRESS NOTES
Consent: Rohith Ortega, who was seen by synchronous (real-time) audio-video technology, and/or her healthcare decision maker, is aware that this patient-initiated, Telehealth encounter on 2/22/2022 is a billable service, with coverage as determined by her insurance carrier. She is aware that she may receive a bill and has provided verbal consent to proceed: Yes. I was in the office while conducting this encounter. Patient identification was verified at the start of the visit: YES  This visit was done with doxy. me  The patient is located in [place] during this visit    Note   Chief Complaint   cough    Rohith Ortega is a 29 y.o. female     1. Mild intermittent reactive airway disease without complication  -     albuterol (PROVENTIL HFA, VENTOLIN HFA, PROAIR HFA) 90 mcg/actuation inhaler; Take 1 Puff by inhalation every four (4) hours as needed for Wheezing., Normal, Disp-1 Each, R-1  -     montelukast (SINGULAIR) 10 mg tablet; Take 1 Tablet by mouth daily. , Normal, Disp-30 Tablet, R-1  -     albuterol (PROVENTIL VENTOLIN) 2.5 mg /3 mL (0.083 %) nebu; 3 mL by Nebulization route every four (4) hours as needed for Wheezing., Normal, Disp-60 Nebule, R-5   2 kids in school with URIs  A month ago - developed a URI  COVID test negative at the time   2 weeks in - got worse, worsening sinus symptoms  Did 10 days of augmentin - helped to clear up mucus   But then worsened again - worsening cough at night, drainage -> started a medrol dose pack and symptoms resolved but then stopped abx and steroids 2 days ago and symptoms coming back yesterday    History of thyroid cancer and hasn't had issues since then   Has a lot of allergies - flonase   Still feels left ear has issues  Feels like oral antihistamines makes it worse   Laryngitis yesterday - feels like scents or something else has been irritating it   Feels like has to constantly clear her throat and because coughing so much feels irritated at night   Feels hoarse No SOB  No wheezing, no history of asthma   No fevers, chills     rec albuterol, singualir to help with allergies, possible reactive airway. rec holding on on further abx and steroids at this time but may consider in the future if still no improvement      We discussed the expected course, resolution and complications of the diagnosis(es) in detail. Medication risks, benefits, costs, interactions, and alternatives were discussed as indicated. I advised her to contact the office if her condition worsens, changes or fails to improve as anticipated. She expressed understanding with the diagnosis(es) and plan. Return to clinic:  As needed    Karli Aaron MD  [facility]  2/22/2022    No future appointments. Objective   Vitals:       Patient-Reported Vitals 2/22/2022   Patient-Reported Weight 145lb                 Physical Exam  Constitutional:       Appearance: Normal appearance. She is not ill-appearing. HENT:      Mouth/Throat:      Comments: Hoarse voice  Cardiovascular:      Rate and Rhythm: Normal rate. Pulmonary:      Effort: No respiratory distress. Neurological:      Mental Status: She is alert. Current Outpatient Medications   Medication Sig    loratadine (Claritin) 10 mg tablet Take 10 mg by mouth.  albuterol (PROVENTIL HFA, VENTOLIN HFA, PROAIR HFA) 90 mcg/actuation inhaler Take 1 Puff by inhalation every four (4) hours as needed for Wheezing.  montelukast (SINGULAIR) 10 mg tablet Take 1 Tablet by mouth daily.  albuterol (PROVENTIL VENTOLIN) 2.5 mg /3 mL (0.083 %) nebu 3 mL by Nebulization route every four (4) hours as needed for Wheezing.  fexofenadine (ALLEGRA) 180 mg tablet Take  by mouth.  levothyroxine (SYNTHROID) 125 mcg tablet Take 1 Tab by mouth Daily (before breakfast).  fluticasone (FLONASE ALLERGY RELIEF) 50 mcg/actuation nasal spray 2 Sprays by Both Nostrils route daily.     eletriptan hydrobromide (RELPAX PO) Take  by mouth. (Patient not taking: Reported on 2/22/2022)    copper (PARAGARD T 380A) 380 square mm IUD ParaGard T 380A 380 square mm intrauterine device   Take by intrauterine route. (Patient not taking: Reported on 2/22/2022)     No current facility-administered medications for this visit. Dayanna Moreno is a 29 y.o. female being evaluated by a video visit encounter for concerns as above. A caregiver was present when appropriate. Due to this being a TeleHealth encounter (During FAXSD-10 public health emergency), evaluation of the following organ systems was limited: Vitals/Constitutional/EENT/Resp/CV/GI//MS/Neuro/Skin/Heme-Lymph-Imm. Pursuant to the emergency declaration under the [facility], 1135 waiver authority and the [employer] Act, this Virtual  Visit was conducted, with patient's (and/or legal guardian's) consent, to reduce the patient's risk of exposure to COVID-19 and provide necessary medical care. Services were provided through a video synchronous discussion virtually to substitute for in-person clinic visit.